# Patient Record
Sex: MALE | Race: WHITE | NOT HISPANIC OR LATINO | Employment: OTHER | URBAN - NONMETROPOLITAN AREA
[De-identification: names, ages, dates, MRNs, and addresses within clinical notes are randomized per-mention and may not be internally consistent; named-entity substitution may affect disease eponyms.]

---

## 2017-03-23 DIAGNOSIS — M54.50 LOW BACK PAIN: Primary | ICD-10-CM

## 2017-03-27 ENCOUNTER — HOSPITAL ENCOUNTER (OUTPATIENT)
Dept: MRI IMAGING | Facility: HOSPITAL | Age: 62
Discharge: HOME | End: 2017-03-27
Attending: FAMILY MEDICINE | Admitting: FAMILY MEDICINE
Payer: COMMERCIAL

## 2017-03-27 PROCEDURE — 72148 MRI LUMBAR SPINE W/O DYE: CPT | Mod: TC

## 2017-04-11 DIAGNOSIS — R10.13 ABDOMINAL PAIN, EPIGASTRIC: Primary | ICD-10-CM

## 2017-04-11 DIAGNOSIS — M54.9 BACK PAIN: ICD-10-CM

## 2017-04-11 DIAGNOSIS — R10.9 FLANK PAIN: ICD-10-CM

## 2017-04-12 ENCOUNTER — HOSPITAL ENCOUNTER (OUTPATIENT)
Dept: MRI IMAGING | Facility: HOSPITAL | Age: 62
Discharge: HOME | End: 2017-04-12
Attending: FAMILY MEDICINE | Admitting: FAMILY MEDICINE
Payer: COMMERCIAL

## 2017-04-12 PROCEDURE — A9585 GADOBUTROL INJECTION: HCPCS | Mod: TC

## 2017-04-12 PROCEDURE — 74183 MRI ABD W/O CNTR FLWD CNTR: CPT | Mod: TC

## 2017-04-12 PROCEDURE — 72146 MRI CHEST SPINE W/O DYE: CPT | Mod: TC

## 2017-04-12 RX ORDER — GADOBUTROL 604.72 MG/ML
10 INJECTION INTRAVENOUS ONCE
Status: COMPLETED | OUTPATIENT
Start: 2017-04-12 | End: 2017-04-12

## 2017-04-12 RX ADMIN — GADOBUTROL 10 ML: 604.72 INJECTION INTRAVENOUS at 16:09

## 2017-10-30 ENCOUNTER — HOSPITAL ENCOUNTER (EMERGENCY)
Facility: HOSPITAL | Age: 62
Discharge: HOME | End: 2017-10-30
Attending: PHYSICIAN ASSISTANT | Admitting: PHYSICIAN ASSISTANT
Payer: COMMERCIAL

## 2017-10-30 ENCOUNTER — APPOINTMENT (OUTPATIENT)
Dept: GENERAL RADIOLOGY | Facility: HOSPITAL | Age: 62
End: 2017-10-30
Attending: NURSE PRACTITIONER
Payer: COMMERCIAL

## 2017-10-30 VITALS
TEMPERATURE: 97.4 F | RESPIRATION RATE: 16 BRPM | SYSTOLIC BLOOD PRESSURE: 149 MMHG | OXYGEN SATURATION: 97 % | DIASTOLIC BLOOD PRESSURE: 84 MMHG

## 2017-10-30 DIAGNOSIS — M77.8 TENDONITIS OF WRIST, LEFT: ICD-10-CM

## 2017-10-30 PROCEDURE — 73110 X-RAY EXAM OF WRIST: CPT | Mod: TC,LT

## 2017-10-30 PROCEDURE — 99213 OFFICE O/P EST LOW 20 MIN: CPT | Performed by: PHYSICIAN ASSISTANT

## 2017-10-30 PROCEDURE — 99213 OFFICE O/P EST LOW 20 MIN: CPT

## 2017-10-30 RX ORDER — PREDNISONE 20 MG/1
TABLET ORAL
Qty: 10 TABLET | Refills: 0 | Status: SHIPPED | OUTPATIENT
Start: 2017-10-30 | End: 2018-12-07

## 2017-10-30 ASSESSMENT — ENCOUNTER SYMPTOMS
CARDIOVASCULAR NEGATIVE: 1
NEUROLOGICAL NEGATIVE: 1
MYALGIAS: 1
PSYCHIATRIC NEGATIVE: 1
JOINT SWELLING: 1
CONSTITUTIONAL NEGATIVE: 1
ARTHRALGIAS: 1

## 2017-10-30 NOTE — ED AVS SNAPSHOT
[facility]    [address] [address]    Phone:  [phone]                                       Hipolito Stout   MRN: [id number]    Department:  [facility]   Date of Visit:  10/30/2017           After Visit Summary Signature Page     I have received my discharge instructions, and my questions have been answered. I have discussed any challenges I see with this plan with the nurse or doctor.    ..........................................................................................................................................  Patient/Patient Representative Signature      ..........................................................................................................................................  Patient Representative Print Name and Relationship to Patient    ..................................................               ................................................  Date                                            Time    ..........................................................................................................................................  Reviewed by Signature/Title    ...................................................              ..............................................  Date                                                            Time

## 2017-10-30 NOTE — ED NOTES
Patient presents with complaints of left wrist pain.  First noticed pain approximately 1 week ago.  States symptoms have gotten worse.  No obvious trauma or injury noted.  CMS intact.  Some swelling noted.  Denies any recent tick bites.

## 2017-10-30 NOTE — ED AVS SNAPSHOT
[facility]    [address] [address]    Phone:  [phone]                                       Hipolito Stout   MRN: [id number]    Department:  [facility]   Date of Visit:  10/30/2017           Patient Information     Date Of Birth          1955        Your diagnoses for this visit were:     Tendonitis of wrist, left        You were seen by Leia Prabhakar PA.      Follow-up Information     Follow up with Beck Gonzalez MD.    Specialty:  Family Practice    Why:  If symptoms worsen    Contact information:    [facility]  [address] [address] [phone]          Follow up with [facility].    Specialty:  EMERGENCY MEDICINE    Why:  If further concerns develop    Contact information:    [address] [address] [phone]    Additional information:    From [facility]: Take [address]. Turn left at [address]. Turn left at the first stoplight on [address]. At the first stop sign, take a right onto [address]. Take a left into the parking lot and continue through until you reach the North enterance of the building.       From [place]: Take [address]. Take the [address] ramp towards [place]. Turn left onto [address]. Take a slight right onto [address]. Turn left at the first stoplight on [address]. At the first stop sign, take a right onto [address]. Take a left into the parking lot and continue through until you reach the North enterance of the building.       From [place]: Take [address]. Take a right at [address]. At the first stop sign, take a right onto [address]. Take a left into the parking lot and continue through until you reach the North enterance of the building.       Discharge References/Attachments     TENDONITIS (ENGLISH)      Future Appointments        Provider Department Dept  Phone Center    11/2/2017  8:00 AM [facility] [phone] [facility]         Review of your medicines      START taking        Dose / Directions Last dose taken    predniSONE 20 MG tablet   Commonly known as:  DELTASONE   Quantity:  10 tablet        Take two tablets (= 40mg) each day for 5 (five) days   Refills:  0          Our records show that you are taking the medicines listed below. If these are incorrect, please call your family doctor or clinic.        Dose / Directions Last dose taken    CRESTOR 40 MG tablet   Generic drug:  rosuvastatin        Take by mouth daily   Refills:  0        FISH OIL        daily   Refills:  0        LOPRESSOR PO   Dose:  50 mg        Take 50 mg by mouth daily   Refills:  0        MULTIVITAMIN PO        Take by mouth daily   Refills:  0        VITAMIN B-12 PO        Take by mouth daily   Refills:  0        vitamin D 2000 UNITS Caps        Take by mouth daily   Refills:  0        VITAMIN E        daily   Refills:  0                Prescriptions were sent or printed at these locations (1 Prescription)                   [address] [address]    Telephone:  [phone]   Fax:  [phone]   Hours:                  Printed at Department/Unit printer (1 of 1)         predniSONE (DELTASONE) 20 MG tablet                Procedures and tests performed during your visit     Wrist XR, G/E 3 views, left      Orders Needing Specimen Collection     None      Pending Results     No orders found from 10/28/2017 to 10/31/2017.            Pending Culture Results     No orders found from 10/28/2017 to 10/31/2017.            Thank you for choosing [facility]       Thank you for choosing [facility] for your care. Our goal is always to provide you with excellent care. Hearing back from our patients is one way we can continue to improve our services. Please take a few minutes to complete the written survey that you may  receive in the mail after you visit with us. Thank you!        [employer] Information     [employer] gives you secure access to your electronic health record. If you see a primary care provider, you can also send messages to your care team and make appointments. If you have questions, please call your primary care clinic.  If you do not have a primary care provider, please call [phone] and they will assist you.        Care EveryWhere ID     This is your Care EveryWhere ID. This could be used by other organizations to access your [facility] medical records  [id number]        Equal Access to Services     MARIMAR [facility] : Bj Moreira, marcellus davis, dl laureano, soniya gonzalez . So [facility] [phone].    ATENCIÓN: Si habla español, tiene a thomas disposición servicios gratuitos de asistencia lingüística. Llame al [phone].    We comply with applicable federal civil rights laws and [place] laws. We do not discriminate on the basis of race, color, national origin, age, disability, sex, sexual orientation, or gender identity.            After Visit Summary       This is your record. Keep this with you and show to your community pharmacist(s) and doctor(s) at your next visit.

## 2017-10-30 NOTE — ED NOTES
Pt presents with left wrist pain without knowledge of hurting it in any way.Has had these sx X 1 week.

## 2017-10-31 NOTE — ED PROVIDER NOTES
History     Chief Complaint   Patient presents with     Hand Pain     left wrist pain; no obvious injury or trauma     The history is provided by the patient. No  was used.     Hipolito Stout is a 62 year old male who has one week of increasing left wrist pain, on the pinky side. States he thinks he strained it when holding up a ladder that was trying to fall. Denies any other injury at this time.     Problem List:    Patient Active Problem List    Diagnosis Date Noted     LPRD (laryngopharyngeal reflux disease) 10/02/2013     Priority: Medium     Mass of oral cavity 10/02/2013     Priority: Medium     Thyroid nodule 10/02/2013     Priority: Medium     Neck pain on left side 10/02/2013     Priority: Medium        Past Medical History:    History reviewed. No pertinent past medical history.    Past Surgical History:    Past Surgical History:   Procedure Laterality Date     ABDOMEN SURGERY  08/2012    hernia repair     CHOLECYSTECTOMY       EYE SURGERY      cataract bilateral     ORTHOPEDIC SURGERY      carpal tunnel release bilateral       Family History:    Family History   Problem Relation Age of Onset     HEART DISEASE Mother      Cancer - colorectal Mother      HEART DISEASE Father      DIABETES Father      CANCER Maternal Grandfather      prostate     HEART DISEASE Paternal Grandmother      DIABETES Paternal Grandmother      CEREBROVASCULAR DISEASE Paternal Grandfather        Social History:  Marital Status:   [2]  Social History   Substance Use Topics     Smoking status: Former Smoker     Smokeless tobacco: Never Used     Alcohol use Yes      Comment: social        Medications:      predniSONE (DELTASONE) 20 MG tablet   Metoprolol Tartrate (LOPRESSOR PO)   rosuvastatin (CRESTOR) 40 MG tablet   Multiple Vitamins-Minerals (MULTIVITAMIN OR)   Cholecalciferol (VITAMIN D) 2000 UNITS CAPS   Cyanocobalamin (VITAMIN B-12 PO)   VITAMIN E   FISH OIL         Review of Systems    Constitutional: Negative.    Cardiovascular: Negative.    Musculoskeletal: Positive for arthralgias, joint swelling and myalgias.   Neurological: Negative.    Psychiatric/Behavioral: Negative.        Physical Exam   BP: 149/84  Heart Rate: 69  Temp: 97.4  F (36.3  C)  Resp: 16  SpO2: 97 %      Physical Exam   Constitutional: He is oriented to person, place, and time. He appears well-developed and well-nourished. No distress.   Cardiovascular: Normal rate.    Pulmonary/Chest: Effort normal.   Musculoskeletal:   Left wrist: mild TTP to radial side, mild edema. No erythema/ecchymosis. +AFROM w/ pain. 4/5 strength due to pain. M/n/v intact   Neurological: He is alert and oriented to person, place, and time.   Skin: He is not diaphoretic.   Psychiatric: He has a normal mood and affect.   Nursing note and vitals reviewed.      ED Course     ED Course     Procedures                Wrist XR, G/E 3 views, left (Final result) Result time: 10/30/17 11:17:26     Final result by Scott Boo MD (10/30/17 11:17:26)     Impression:     IMPRESSION: No acute fracture.      SCOTT BOO MD     Narrative:     PROCEDURE:  XR WRIST LEFT G/E 3 VIEWS    HISTORY: pain    COMPARISON:  None.    TECHNIQUE:  3 views of the left wrist were obtained.    FINDINGS:  No fracture or dislocation is identified. There are mild  degenerative changes of the radiocarpal joint and first  carpometacarpal joint.                          Assessments & Plan (with Medical Decision Making)     I have reviewed the nursing notes.    I have reviewed the findings, diagnosis, plan and need for follow up with the patient.      Discharge Medication List as of 10/30/2017 11:24 AM      START taking these medications    Details   predniSONE (DELTASONE) 20 MG tablet Take two tablets (= 40mg) each day for 5 (five) days, Disp-10 tablet, R-0, Local Print             Final diagnoses:   Tendonitis of wrist, left       Ice 15 min/hr while awake  Pt has a wrist splint at  home.  Elevate injured area above heart as often as possible and when resting. Take OTC motrin 800 mg every 8 hours as needed for pain/swelling. Apply ice at least three times a day x 20 minutes.   Patient verbally educated and given appropriate education sheets for the diagnoses and has no questions.  Follow up with your Primary Care provider if symptoms increase or if concerns develop, return to the ER  Leia Prabhakar Certified   Physician Assistant  10/30/2017  7:54 PM  URGENT CARE CLINIC    10/30/2017   [facility]     Leia Prabhakar PA  10/30/17 1956

## 2017-11-02 ENCOUNTER — HOSPITAL ENCOUNTER (OUTPATIENT)
Dept: MRI IMAGING | Facility: HOSPITAL | Age: 62
Discharge: HOME | End: 2017-11-02
Attending: FAMILY MEDICINE | Admitting: FAMILY MEDICINE
Payer: COMMERCIAL

## 2017-11-02 DIAGNOSIS — M54.6 THORACIC BACK PAIN: ICD-10-CM

## 2017-11-02 PROCEDURE — 72146 MRI CHEST SPINE W/O DYE: CPT | Mod: TC

## 2017-11-29 ENCOUNTER — HOSPITAL ENCOUNTER (OUTPATIENT)
Dept: MRI IMAGING | Facility: HOSPITAL | Age: 62
End: 2017-11-29
Attending: FAMILY MEDICINE
Payer: COMMERCIAL

## 2017-11-29 DIAGNOSIS — R10.11 ABDOMINAL PAIN, RIGHT UPPER QUADRANT: ICD-10-CM

## 2017-11-29 PROCEDURE — 25000128 H RX IP 250 OP 636: Performed by: RADIOLOGY

## 2017-11-29 PROCEDURE — A9585 GADOBUTROL INJECTION: HCPCS | Performed by: RADIOLOGY

## 2017-11-29 PROCEDURE — 74183 MRI ABD W/O CNTR FLWD CNTR: CPT | Mod: TC

## 2017-11-29 RX ORDER — GADOBUTROL 604.72 MG/ML
10 INJECTION INTRAVENOUS ONCE
Status: COMPLETED | OUTPATIENT
Start: 2017-11-29 | End: 2017-11-29

## 2017-11-29 RX ADMIN — GADOBUTROL 10 ML: 604.72 INJECTION INTRAVENOUS at 12:30

## 2018-02-01 ENCOUNTER — HOSPITAL ENCOUNTER (OUTPATIENT)
Dept: MRI IMAGING | Facility: HOSPITAL | Age: 63
Discharge: HOME | End: 2018-02-01
Attending: FAMILY MEDICINE | Admitting: FAMILY MEDICINE
Payer: MEDICARE

## 2018-02-01 DIAGNOSIS — M25.561 POSTERIOR KNEE PAIN, RIGHT: ICD-10-CM

## 2018-02-01 PROCEDURE — 73721 MRI JNT OF LWR EXTRE W/O DYE: CPT | Mod: TC,RT

## 2018-12-07 ENCOUNTER — APPOINTMENT (OUTPATIENT)
Dept: GENERAL RADIOLOGY | Facility: OTHER | Age: 63
End: 2018-12-07
Attending: EMERGENCY MEDICINE
Payer: MEDICARE

## 2018-12-07 ENCOUNTER — HOSPITAL ENCOUNTER (EMERGENCY)
Facility: OTHER | Age: 63
Discharge: HOME | End: 2018-12-07
Attending: EMERGENCY MEDICINE | Admitting: EMERGENCY MEDICINE
Payer: MEDICARE

## 2018-12-07 VITALS
OXYGEN SATURATION: 98 % | SYSTOLIC BLOOD PRESSURE: 162 MMHG | HEART RATE: 56 BPM | HEIGHT: 70 IN | RESPIRATION RATE: 16 BRPM | WEIGHT: 202 LBS | BODY MASS INDEX: 28.92 KG/M2 | DIASTOLIC BLOOD PRESSURE: 64 MMHG | TEMPERATURE: 96.8 F

## 2018-12-07 DIAGNOSIS — M70.62 GREATER TROCHANTERIC BURSITIS, LEFT: ICD-10-CM

## 2018-12-07 PROCEDURE — 25000132 ZZH RX MED GY IP 250 OP 250 PS 637: Mod: GY | Performed by: EMERGENCY MEDICINE

## 2018-12-07 PROCEDURE — 25000128 H RX IP 250 OP 636: Performed by: EMERGENCY MEDICINE

## 2018-12-07 PROCEDURE — 99283 EMERGENCY DEPT VISIT LOW MDM: CPT | Mod: Z6 | Performed by: EMERGENCY MEDICINE

## 2018-12-07 PROCEDURE — A9270 NON-COVERED ITEM OR SERVICE: HCPCS | Mod: GY | Performed by: EMERGENCY MEDICINE

## 2018-12-07 PROCEDURE — 99284 EMERGENCY DEPT VISIT MOD MDM: CPT | Mod: 25 | Performed by: EMERGENCY MEDICINE

## 2018-12-07 PROCEDURE — 73502 X-RAY EXAM HIP UNI 2-3 VIEWS: CPT

## 2018-12-07 PROCEDURE — 96372 THER/PROPH/DIAG INJ SC/IM: CPT | Performed by: EMERGENCY MEDICINE

## 2018-12-07 RX ORDER — CYCLOBENZAPRINE HCL 10 MG
10 TABLET ORAL 3 TIMES DAILY PRN
Qty: 20 TABLET | Refills: 0 | Status: SHIPPED | OUTPATIENT
Start: 2018-12-07 | End: 2018-12-13

## 2018-12-07 RX ORDER — CYCLOBENZAPRINE HCL 10 MG
10 TABLET ORAL ONCE
Status: COMPLETED | OUTPATIENT
Start: 2018-12-07 | End: 2018-12-07

## 2018-12-07 RX ORDER — KETOROLAC TROMETHAMINE 30 MG/ML
30 INJECTION, SOLUTION INTRAMUSCULAR; INTRAVENOUS ONCE
Status: COMPLETED | OUTPATIENT
Start: 2018-12-07 | End: 2018-12-07

## 2018-12-07 RX ADMIN — KETOROLAC TROMETHAMINE 30 MG: 30 INJECTION, SOLUTION INTRAMUSCULAR at 09:16

## 2018-12-07 RX ADMIN — CYCLOBENZAPRINE HYDROCHLORIDE 10 MG: 10 TABLET, FILM COATED ORAL at 10:41

## 2018-12-07 ASSESSMENT — ENCOUNTER SYMPTOMS
CHEST TIGHTNESS: 0
FEVER: 0
CHILLS: 0
ARTHRALGIAS: 1
LIGHT-HEADEDNESS: 0
AGITATION: 0
NAUSEA: 0
VOMITING: 0
DYSURIA: 0
SHORTNESS OF BREATH: 0

## 2018-12-07 NOTE — ED AVS SNAPSHOT
[facility]    [address]    [place] [address]    Phone:  [phone]    Fax:  [phone]                                       Hipolito Stout   MRN: [id number]    Department:  [facility]   Date of Visit:  12/7/2018           Patient Information     Date Of Birth          1955        Your diagnoses for this visit were:     Greater trochanteric bursitis, left        You were seen by Emir Tomas MD.        Discharge Instructions         Understanding Trochanteric Bursitis    A bursa is a thin, slippery, sac-like film. It contains a small amount of fluid. This structure is found between bones and soft tissues in and around joints. A bursa cushions and protects a joint. It keeps parts of a joint from rubbing against each other. If a bursa becomes inflamed and irritated, it is known as bursitis.  The trochanteric bursa is found on the hip joint. It lies on top of the bump at the top of the thighbone called the greater trochanter. Inflammation of this bursa is called trochanteric bursitis.     How to say it  [id number]   Causes of trochanteric bursitis  Causes may include:    Overuse of the hip during running or other sports, dance, or work    Falling on or irritation to the side of the hip  This condition may occur along with other problems, such as osteoarthritis of the hip or knee, or low back problems. In rare cases, it may occur after hip surgery.  Symptoms of trochanteric bursitis    Pain or aching on the side of the hip. The pain may travel down the leg.    Swelling, tenderness, or warmth on the side of the hip at the bony bump at the top of the thigh  Treatment for trochanteric bursitis  These may include:    Resting the hip. This allows the bursa to heal.    Prescription or over-the-counter pain medicines. These help reduce inflammation, swelling, and pain.    Cold packs and heat packs. These help reduce pain and  swelling.    Stretching and strengthening exercises. These improve flexibility and strength around the hip.    Physical therapy. This includes exercises or other treatments.    Injections of medicine into the bursa. This may help reduce inflammation and relieve symptoms.  Possible complications  If you don t give your hip time to heal, the problem may not go away, may return, or may get worse. Rest and treat your hip as directed.     When to call your healthcare provider  Call your healthcare provider right away if you have any of these:    Fever of 100.4 F (38 C) or higher, or as directed    Redness, swelling, or warmth that gets worse    Symptoms that don t get better with prescribed medicines, or get worse    New symptoms   Date Last Reviewed: 3/29/2016    8475-2284 [employer]. [address] [place] [address], [place] [address]. All rights reserved. This information is not intended as a substitute for professional medical care. Always follow your healthcare professional's instructions.          24 Hour Appointment Hotline     To schedule an appointment at [facility], please call [phone]. If you don't have a family doctor or clinic, we will help you find one. [facility] are conveniently located to serve the needs of you and your family.           Review of your medicines      START taking        Dose / Directions Last dose taken    cyclobenzaprine 10 MG tablet   Commonly known as:  FLEXERIL   Dose:  10 mg   Quantity:  20 tablet        Take 1 tablet (10 mg) by mouth 3 times daily as needed for muscle spasms   Refills:  0          Our records show that you are taking the medicines listed below. If these are incorrect, please call your family doctor or clinic.        Dose / Directions Last dose taken    CRESTOR 40 MG tablet   Generic drug:  rosuvastatin        Take by mouth daily   Refills:  0        FISH OIL        daily   Refills:  0        LOPRESSOR PO   Dose:  50 mg        Take 50 mg by mouth  daily   Refills:  0        MULTIVITAMIN PO        Take by mouth daily   Refills:  0        VITAMIN B-12 PO        Take by mouth daily   Refills:  0        vitamin D 2000 units Caps        Take by mouth daily   Refills:  0        VITAMIN E        daily   Refills:  0                Prescriptions were sent or printed at these locations (1 Prescription)                   [id number] - [place] - [address]   [address] [place] [address]    Telephone:  [phone]   Fax:  [phone]   Hours:                  Printed at Department/Unit printer (1 of 1)         cyclobenzaprine (FLEXERIL) 10 MG tablet                Procedures and tests performed during your visit     XR Hip Left 2-3 Views      Orders Needing Specimen Collection     None      Pending Results     No orders found from 12/5/2018 to 12/8/2018.            Pending Culture Results     No orders found from 12/5/2018 to 12/8/2018.            Pending Results Instructions     If you had any lab results that were not finalized at the time of your Discharge, you can call the ED Lab Result RN at [phone]. You will be contacted by this team for any positive Lab results or changes in treatment. The nurses are available 7 days a week from 10A to 6:30P.  You can leave a message 24 hours per day and they will return your call.        Thank you for choosing [facility]       Thank you for choosing [facility] for your care. Our goal is always to provide you with excellent care. Hearing back from our patients is one way we can continue to improve our services. Please take a few minutes to complete the written survey that you may receive in the mail after you visit with us. Thank you!        [employer] Information     [email or web address] gives you secure access to your electronic health record. If you see a primary care provider, you can also send messages to your care team and make appointments. If you have questions, please call your  primary care clinic.  If you do not have a primary care provider, please call [phone] and they will assist you.        Care EveryWhere ID     This is your Care EveryWhere ID. This could be used by other organizations to access your [facility] medical records  [id number]        Equal Access to Services     MARIMAR KNAPP : Bj Moreira, marcellus davis, dl laureano, soniya zhang. So [facility] [phone].    ATENCIÓN: Si habla español, tiene a thomas disposición servicios gratuitos de asistencia lingüística. Llame al [phone].    We comply with applicable federal civil rights laws and [place] laws. We do not discriminate on the basis of race, color, national origin, age, disability, sex, sexual orientation, or gender identity.            After Visit Summary       This is your record. Keep this with you and show to your community pharmacist(s) and doctor(s) at your next visit.

## 2018-12-07 NOTE — ED AVS SNAPSHOT
[facility]    [address]    [place] [address]    Phone:  [phone]    Fax:  [phone]                                       Hipolito Stout   MRN: [id number]    Department:  [facility]   Date of Visit:  12/7/2018           After Visit Summary Signature Page     I have received my discharge instructions, and my questions have been answered. I have discussed any challenges I see with this plan with the nurse or doctor.    ..........................................................................................................................................  Patient/Patient Representative Signature      ..........................................................................................................................................  Patient Representative Print Name and Relationship to Patient    ..................................................               ................................................  Date                                   Time    ..........................................................................................................................................  Reviewed by Signature/Title    ...................................................              ..............................................  Date                                               Time          22EPIC Rev 08/18

## 2018-12-07 NOTE — ED PROVIDER NOTES
History     Chief Complaint   Patient presents with     Hip Pain     The history is provided by the patient.     Hipolito Stout is a 63 year old male who is here complaining of left hip pain.  It began 4 days ago after he had been quite active.  He had been playing hockey earlier in the day, after that he was up and down a ladder quite a bit putting up [place] lights.  He did not really have any pain while he was doing this, however in the evening he started developing pain.  Over the last few days it is gotten progressively worse.  Last night he did not sleep at all because of the pain.  He does have a little bit of occasional sharp shooting pain in his low back, but this does not seem to be associated with the hip pain.  He says he can find a spot on the lateral hip that is tender when he pushes.  The pain seems to radiate out from here into the groin area and towards the low back as well.  He is able to walk but he is limping quite a bit.  He has tried Tylenol just once and this did not seem to help.  Not feeling ill otherwise.    Problem List:    Patient Active Problem List    Diagnosis Date Noted     LPRD (laryngopharyngeal reflux disease) 10/02/2013     Priority: Medium     Mass of oral cavity 10/02/2013     Priority: Medium     Thyroid nodule 10/02/2013     Priority: Medium     Neck pain on left side 10/02/2013     Priority: Medium        Past Medical History:    History reviewed. No pertinent past medical history.    Past Surgical History:    Past Surgical History:   Procedure Laterality Date     ABDOMEN SURGERY  08/2012    hernia repair     CHOLECYSTECTOMY       EYE SURGERY      cataract bilateral     ORTHOPEDIC SURGERY      carpal tunnel release bilateral       Family History:    Family History   Problem Relation Age of Onset     Heart Disease Mother      Cancer - colorectal Mother      Heart Disease Father      Diabetes Father      Cancer Maternal Grandfather      prostate     Heart Disease  "Paternal Grandmother      Diabetes Paternal Grandmother      Cerebrovascular Disease Paternal Grandfather        Social History:  Marital Status:   [2]  Social History   Substance Use Topics     Smoking status: Former Smoker     Smokeless tobacco: Never Used     Alcohol use Yes      Comment: social        Medications:      cyclobenzaprine (FLEXERIL) 10 MG tablet   Metoprolol Tartrate (LOPRESSOR PO)   rosuvastatin (CRESTOR) 40 MG tablet   Cholecalciferol (VITAMIN D) 2000 UNITS CAPS   Cyanocobalamin (VITAMIN B-12 PO)   FISH OIL   Multiple Vitamins-Minerals (MULTIVITAMIN OR)   VITAMIN E         Review of Systems   Constitutional: Negative for chills and fever.   HENT: Negative for congestion.    Eyes: Negative for visual disturbance.   Respiratory: Negative for chest tightness and shortness of breath.    Cardiovascular: Negative for chest pain.   Gastrointestinal: Negative for nausea and vomiting.   Genitourinary: Negative for dysuria.   Musculoskeletal: Positive for arthralgias.   Skin: Negative for rash.   Neurological: Negative for light-headedness.   Psychiatric/Behavioral: Negative for agitation.       Physical Exam   BP: 176/89  Heart Rate: 56  Temp: 96.7  F (35.9  C)  Resp: 16  Height: 177.8 cm (5' 10\")  Weight: 91.6 kg (202 lb)  SpO2: 98 %      Physical Exam   Constitutional: He is oriented to person, place, and time. He appears well-developed and well-nourished. No distress.   HENT:   Head: Normocephalic and atraumatic.   Eyes: Conjunctivae are normal.   Neck: Neck supple.   Cardiovascular: Normal rate.    Pulmonary/Chest: Effort normal.   Musculoskeletal:   He is tender over the left greater trochanteric bursa.  He does have a little bit of pain with range of motion in the hip but with passive range of motion he has full range of motion.  He says when he tries to lift his  leg up off the bed it is not too painful but when he lets it back down again it is very painful.   Neurological: He is alert and " oriented to person, place, and time.   Skin: Skin is warm and dry. He is not diaphoretic.   Psychiatric: He has a normal mood and affect. His behavior is normal.   Nursing note and vitals reviewed.      ED Course     ED Course     Procedures              Results for orders placed or performed during the hospital encounter of 12/07/18 (from the past 24 hour(s))   XR Hip Left 2-3 Views    Narrative    PROCEDURE:  XR HIP LEFT 2-3 VIEWS    HISTORY: pain over greater trochanter;     COMPARISON:  None.    TECHNIQUE:  2 views of the left hip were obtained.    FINDINGS:  No fracture or dislocation is identified. The joint spaces  are preserved.        Impression    IMPRESSION: No acute fracture.      SCOTT BOO MD       Medications   cyclobenzaprine (FLEXERIL) tablet 10 mg (not administered)   ketorolac (TORADOL) injection 30 mg (30 mg Intramuscular Given 12/7/18 0916)       Assessments & Plan (with Medical Decision Making)     I have reviewed the nursing notes.    I have reviewed the findings, diagnosis, plan and need for follow up with the patient.  I believe he has a greater trochanteric bursitis on the left.  Recommended anti-inflammatories.  He has ibuprofen at home so I suggested 800 mg every 6-8 hours with food.  He may have a little bit of muscle spasm in the area as well so we will try some Flexeril to see if that is helpful.  If this does help he is also given a prescription for it.  He will follow-up with his primary physician next week, he can return here if worse.    New Prescriptions    CYCLOBENZAPRINE (FLEXERIL) 10 MG TABLET    Take 1 tablet (10 mg) by mouth 3 times daily as needed for muscle spasms       Final diagnoses:   Greater trochanteric bursitis, left       12/7/2018   [facility]     Emir Tomas MD  12/07/18 5284

## 2018-12-07 NOTE — ED TRIAGE NOTES
Patient reporting pain in left hip that started on Monday and has progressively gotten worse. Patient denies injury to hip. Left hip is weakened compared to right hip. Patient A/O, able to walk on hip to room. Patient resting comfortably in room. Patient denies using ice or heat, or medications at home for pain.

## 2018-12-07 NOTE — DISCHARGE INSTRUCTIONS
Understanding Trochanteric Bursitis    A bursa is a thin, slippery, sac-like film. It contains a small amount of fluid. This structure is found between bones and soft tissues in and around joints. A bursa cushions and protects a joint. It keeps parts of a joint from rubbing against each other. If a bursa becomes inflamed and irritated, it is known as bursitis.  The trochanteric bursa is found on the hip joint. It lies on top of the bump at the top of the thighbone called the greater trochanter. Inflammation of this bursa is called trochanteric bursitis.     How to say it  [id number]   Causes of trochanteric bursitis  Causes may include:    Overuse of the hip during running or other sports, dance, or work    Falling on or irritation to the side of the hip  This condition may occur along with other problems, such as osteoarthritis of the hip or knee, or low back problems. In rare cases, it may occur after hip surgery.  Symptoms of trochanteric bursitis    Pain or aching on the side of the hip. The pain may travel down the leg.    Swelling, tenderness, or warmth on the side of the hip at the bony bump at the top of the thigh  Treatment for trochanteric bursitis  These may include:    Resting the hip. This allows the bursa to heal.    Prescription or over-the-counter pain medicines. These help reduce inflammation, swelling, and pain.    Cold packs and heat packs. These help reduce pain and swelling.    Stretching and strengthening exercises. These improve flexibility and strength around the hip.    Physical therapy. This includes exercises or other treatments.    Injections of medicine into the bursa. This may help reduce inflammation and relieve symptoms.  Possible complications  If you don t give your hip time to heal, the problem may not go away, may return, or may get worse. Rest and treat your hip as directed.     When to call your healthcare provider  Call your healthcare provider right away if you have  any of these:    Fever of 100.4 F (38 C) or higher, or as directed    Redness, swelling, or warmth that gets worse    Symptoms that don t get better with prescribed medicines, or get worse    New symptoms   Date Last Reviewed: 3/29/2016    0523-4292 [employer]. [address] [place] [address]. All rights reserved. This information is not intended as a substitute for professional medical care. Always follow your healthcare professional's instructions.

## 2019-05-15 ENCOUNTER — OFFICE VISIT (OUTPATIENT)
Dept: UROLOGY | Facility: OTHER | Age: 64
End: 2019-05-15
Attending: UROLOGY
Payer: COMMERCIAL

## 2019-05-15 VITALS
SYSTOLIC BLOOD PRESSURE: 130 MMHG | BODY MASS INDEX: 29.44 KG/M2 | DIASTOLIC BLOOD PRESSURE: 78 MMHG | TEMPERATURE: 97.8 F | WEIGHT: 205.2 LBS | RESPIRATION RATE: 18 BRPM | HEART RATE: 64 BPM

## 2019-05-15 DIAGNOSIS — N32.81 OVERACTIVE BLADDER: Primary | ICD-10-CM

## 2019-05-15 LAB
ALBUMIN UR-MCNC: NEGATIVE MG/DL
APPEARANCE UR: CLEAR
BILIRUB UR QL STRIP: NEGATIVE
COLOR UR AUTO: YELLOW
GLUCOSE UR STRIP-MCNC: NEGATIVE MG/DL
HGB UR QL STRIP: NEGATIVE
KETONES UR STRIP-MCNC: NEGATIVE MG/DL
LEUKOCYTE ESTERASE UR QL STRIP: NEGATIVE
NITRATE UR QL: NEGATIVE
PH UR STRIP: 5.5 PH (ref 5–9)
SOURCE: NORMAL
SP GR UR STRIP: 1.01 (ref 1–1.03)
UROBILINOGEN UR STRIP-ACNC: 0.2 EU/DL (ref 0.2–1)

## 2019-05-15 PROCEDURE — 81003 URINALYSIS AUTO W/O SCOPE: CPT | Mod: ZL | Performed by: UROLOGY

## 2019-05-15 PROCEDURE — 51798 US URINE CAPACITY MEASURE: CPT

## 2019-05-15 PROCEDURE — 99203 OFFICE O/P NEW LOW 30 MIN: CPT | Performed by: UROLOGY

## 2019-05-15 PROCEDURE — G0463 HOSPITAL OUTPT CLINIC VISIT: HCPCS | Mod: 25

## 2019-05-15 ASSESSMENT — PAIN SCALES - GENERAL: PAINLEVEL: MODERATE PAIN (5)

## 2019-05-15 NOTE — NURSING NOTE
Chief Complaint   Patient presents with     Consult For     Urination Pattern   Post-Void Residual  A post-void residual was measured by ultrasonic bladder scanner.  0 mL  Marybeth Wren LPN  5/15/2019 11:42 AM        Medication Reconciliation: completed   Marybeth Wren LPN  5/15/2019 11:36 AM

## 2019-05-15 NOTE — PROGRESS NOTES
Type of Visit  NPV    Chief Complaint  Urinary frequency    HPI  Mr. Stout is a 64 year old male who presents with concerns regarding urinary pattern.  The patient denies gross hematuria.  He does have a remote history of kidney stones but denies acute renal colic.  He does complain of noticing worsening urgency and frequency over the last few years.  The overall clinical bother is low.  He denies dysuria.  He underwent CT imaging about 4 years ago which revealed no stones.      Past Medical History  He  has no past medical history on file.  Patient Active Problem List   Diagnosis     LPRD (laryngopharyngeal reflux disease)     Mass of oral cavity     Thyroid nodule     Neck pain on left side       Past Surgical History  He  has a past surgical history that includes Abdomen surgery (08/2012); Cholecystectomy; orthopedic surgery; and Eye surgery.    Medications  He has a current medication list which includes the following prescription(s): vitamin d, cyanocobalamin, fish oil, metoprolol tartrate, multiple vitamins-minerals, rosuvastatin, and vitamin e.    Allergies  Allergies   Allergen Reactions     Cats        Social History  He  reports that he has quit smoking. He has never used smokeless tobacco. He reports that he drinks alcohol. He reports that he does not use drugs.  No drug abuse.    Family History  Family History   Problem Relation Age of Onset     Heart Disease Mother      Cancer - colorectal Mother      Heart Disease Father      Diabetes Father      Cancer Maternal Grandfather         prostate     Heart Disease Paternal Grandmother      Diabetes Paternal Grandmother      Cerebrovascular Disease Paternal Grandfather        Review of Systems  I personally reviewed the ROS with the patient.    Nursing Notes:   Marybeth Wren LPN  5/15/2019 11:36 AM  Signed  Chief Complaint   Patient presents with     Consult For     Urination Pattern   Patient presents to the clinic for urination pattern.  Review of  Systems:    Weight loss:    No     Recent fever/chills:  No   Night sweats:   Yes  Current skin rash:  No   Recent hair loss:  No  Heat intolerance:  No   Cold intolerance:  No  Chest pain:   No   Palpitations:   Yes  Shortness of breath:  Yes   Wheezing:   No  Constipation:    Yes   Diarrhea:   Yes   Nausea:   No   Vomiting:   No   Kidney/side pain:  Yes   Back pain:   Yes  Frequent headaches:  No   Dizziness:     Yes  Leg swelling:   Yes   Calf pain:    Yes    Parents, brothers or sisters with history of kidney cancer:   No  Parents, brothers or sisters with history of bladder cancer: No      Medication Reconciliation: completed   Marybeth Wren LPN  5/15/2019 11:29 AM     Marybeth Wren LPN  5/15/2019 11:47 AM  Signed  Chief Complaint   Patient presents with     Consult For     Urination Pattern   Post-Void Residual  A post-void residual was measured by ultrasonic bladder scanner.  0 mL  Marybeth Wren LPN  5/15/2019 11:42 AM        Medication Reconciliation: completed   Marybeth Wren LPN  5/15/2019 11:36 AM     Physical Exam  Vitals:    05/15/19 1132   BP: 130/78   BP Location: Right arm   Patient Position: Sitting   Cuff Size: Adult Large   Pulse: 64   Resp: 18   Temp: 97.8  F (36.6  C)   TempSrc: Temporal   Weight: 93.1 kg (205 lb 3.2 oz)     Constitutional: No acute distress.  Alert and cooperative   Head: NCAT  Eyes: Conjunctivae normal  Cardiovascular: Regular rate.  Pulmonary/Chest: Respirations are even and non-labored bilaterally, no audible wheezing  Abdominal: Soft. No distension, tenderness, masses or guarding.   Neurological: A + O x 3.  Cranial Nerves II-XII grossly intact.  Extremities: KELY x 4, Warm. No clubbing.  No cyanosis.    Skin: Pink, warm and dry.  No visible rashes noted.  Psychiatric:  Normal mood and affect  Back:  No left CVA tenderness.  No right CVA tenderness.  Genitourinary:  Nonpalpable bladder    Labs  Results for orders placed or performed in visit on 05/15/19   *UA reflex to  Microscopic   Result Value Ref Range    Color Urine Yellow     Appearance Urine Clear     Glucose Urine Negative NEG^Negative mg/dL    Bilirubin Urine Negative NEG^Negative    Ketones Urine Negative NEG^Negative mg/dL    Specific Gravity Urine 1.015 1.000 - 1.030    Blood Urine Negative NEG^Negative    pH Urine 5.5 5.0 - 9.0 pH    Protein Albumin Urine Negative NEG^Negative mg/dL    Urobilinogen Urine 0.2 0.2 - 1.0 EU/dL    Nitrite Urine Negative NEG^Negative    Leukocyte Esterase Urine Negative NEG^Negative    Source Unspecified Urine      OSH labs  PSA over last 9 years all <1, most recently 5/2019    Stone analysis  2011  100% Calcium oxalate monohydrate    Imaging  CT a/p   12/2/2015  I personally reviewed and interpreted the images and report.  IMPRESSION:  NO ACUTE INTRA-ABDOMINAL PROCESS.    Post-Void Residual  A post-void residual was measured by ultrasonic bladder scanner.  0 mL today    Assessment & Plan  Mr. Stotu is a 64 year old male who presents with concerns regarding urinary urgency.  His PSA is normal, PVR is normal, UA is completely clear.  We discussed anticholinergic medications but I recommended against the medications as I feel the side effects will probably cause more problem than the symptoms.  I reviewed the objective data listed above and reassured the patient that all testing is normal.  He requires no further testing for his urinary symptoms currently.

## 2019-05-15 NOTE — NURSING NOTE
Chief Complaint   Patient presents with     Consult For     Urination Pattern   Patient presents to the clinic for urination pattern.  Review of Systems:    Weight loss:    No     Recent fever/chills:  No   Night sweats:   Yes  Current skin rash:  No   Recent hair loss:  No  Heat intolerance:  No   Cold intolerance:  No  Chest pain:   No   Palpitations:   Yes  Shortness of breath:  Yes   Wheezing:   No  Constipation:    Yes   Diarrhea:   Yes   Nausea:   No   Vomiting:   No   Kidney/side pain:  Yes   Back pain:   Yes  Frequent headaches:  No   Dizziness:     Yes  Leg swelling:   Yes   Calf pain:    Yes    Parents, brothers or sisters with history of kidney cancer:   No  Parents, brothers or sisters with history of bladder cancer: No      Medication Reconciliation: completed   Marybeth Wren LPN  5/15/2019 11:29 AM

## 2019-09-03 ENCOUNTER — HOSPITAL ENCOUNTER (OUTPATIENT)
Dept: MRI IMAGING | Facility: OTHER | Age: 64
Discharge: HOME | End: 2019-09-03
Admitting: FAMILY MEDICINE
Payer: MEDICARE

## 2019-09-03 DIAGNOSIS — M54.9 MID BACK PAIN: ICD-10-CM

## 2019-09-03 DIAGNOSIS — M54.2 CERVICALGIA: ICD-10-CM

## 2019-09-03 PROCEDURE — 72141 MRI NECK SPINE W/O DYE: CPT

## 2019-10-04 ENCOUNTER — HEALTH MAINTENANCE LETTER (OUTPATIENT)
Age: 64
End: 2019-10-04

## 2019-10-18 ENCOUNTER — HOSPITAL ENCOUNTER (EMERGENCY)
Facility: OTHER | Age: 64
Discharge: HOME | End: 2019-10-18
Attending: EMERGENCY MEDICINE | Admitting: EMERGENCY MEDICINE
Payer: MEDICARE

## 2019-10-18 ENCOUNTER — APPOINTMENT (OUTPATIENT)
Dept: GENERAL RADIOLOGY | Facility: OTHER | Age: 64
End: 2019-10-18
Attending: EMERGENCY MEDICINE
Payer: MEDICARE

## 2019-10-18 VITALS
OXYGEN SATURATION: 96 % | HEIGHT: 70 IN | DIASTOLIC BLOOD PRESSURE: 91 MMHG | BODY MASS INDEX: 29.35 KG/M2 | RESPIRATION RATE: 8 BRPM | HEART RATE: 77 BPM | TEMPERATURE: 97.5 F | WEIGHT: 205 LBS | SYSTOLIC BLOOD PRESSURE: 141 MMHG

## 2019-10-18 DIAGNOSIS — R07.9 CHEST PAIN, UNSPECIFIED TYPE: ICD-10-CM

## 2019-10-18 LAB
ALBUMIN SERPL-MCNC: 4.7 G/DL (ref 3.5–5.7)
ALP SERPL-CCNC: 42 U/L (ref 34–104)
ALT SERPL W P-5'-P-CCNC: 22 U/L (ref 7–52)
ANION GAP SERPL CALCULATED.3IONS-SCNC: 7 MMOL/L (ref 3–14)
APTT PPP: 30 SEC (ref 22–37)
AST SERPL W P-5'-P-CCNC: 21 U/L (ref 13–39)
BASOPHILS # BLD AUTO: 0 10E9/L (ref 0–0.2)
BASOPHILS NFR BLD AUTO: 0.6 %
BILIRUB SERPL-MCNC: 0.8 MG/DL (ref 0.3–1)
BUN SERPL-MCNC: 16 MG/DL (ref 7–25)
CALCIUM SERPL-MCNC: 9.7 MG/DL (ref 8.6–10.3)
CHLORIDE SERPL-SCNC: 103 MMOL/L (ref 98–107)
CO2 SERPL-SCNC: 28 MMOL/L (ref 21–31)
CREAT SERPL-MCNC: 1.02 MG/DL (ref 0.7–1.3)
DIFFERENTIAL METHOD BLD: NORMAL
EOSINOPHIL # BLD AUTO: 0.1 10E9/L (ref 0–0.7)
EOSINOPHIL NFR BLD AUTO: 1.5 %
ERYTHROCYTE [DISTWIDTH] IN BLOOD BY AUTOMATED COUNT: 12.5 % (ref 10–15)
GFR SERPL CREATININE-BSD FRML MDRD: 73 ML/MIN/{1.73_M2}
GLUCOSE SERPL-MCNC: 108 MG/DL (ref 70–105)
HCT VFR BLD AUTO: 46.3 % (ref 40–53)
HGB BLD-MCNC: 15.2 G/DL (ref 13.3–17.7)
IMM GRANULOCYTES # BLD: 0 10E9/L (ref 0–0.4)
IMM GRANULOCYTES NFR BLD: 0.4 %
INR PPP: 0.92 (ref 0–1.3)
LYMPHOCYTES # BLD AUTO: 1.6 10E9/L (ref 0.8–5.3)
LYMPHOCYTES NFR BLD AUTO: 30.6 %
MAGNESIUM SERPL-MCNC: 2.2 MG/DL (ref 1.9–2.7)
MCH RBC QN AUTO: 29.6 PG (ref 26.5–33)
MCHC RBC AUTO-ENTMCNC: 32.8 G/DL (ref 31.5–36.5)
MCV RBC AUTO: 90 FL (ref 78–100)
MONOCYTES # BLD AUTO: 0.4 10E9/L (ref 0–1.3)
MONOCYTES NFR BLD AUTO: 8.1 %
NEUTROPHILS # BLD AUTO: 3.1 10E9/L (ref 1.6–8.3)
NEUTROPHILS NFR BLD AUTO: 58.8 %
NT-PROBNP SERPL-MCNC: 10 PG/ML (ref 0–100)
PLATELET # BLD AUTO: 208 10E9/L (ref 150–450)
POTASSIUM SERPL-SCNC: 4.6 MMOL/L (ref 3.5–5.1)
PROT SERPL-MCNC: 7.8 G/DL (ref 6.4–8.9)
RBC # BLD AUTO: 5.14 10E12/L (ref 4.4–5.9)
SODIUM SERPL-SCNC: 138 MMOL/L (ref 134–144)
TROPONIN I SERPL-MCNC: 3 PG/ML
TROPONIN I SERPL-MCNC: 3.7 PG/ML
WBC # BLD AUTO: 5.2 10E9/L (ref 4–11)

## 2019-10-18 PROCEDURE — 25000132 ZZH RX MED GY IP 250 OP 250 PS 637: Performed by: EMERGENCY MEDICINE

## 2019-10-18 PROCEDURE — 94640 AIRWAY INHALATION TREATMENT: CPT | Performed by: EMERGENCY MEDICINE

## 2019-10-18 PROCEDURE — 85610 PROTHROMBIN TIME: CPT | Performed by: EMERGENCY MEDICINE

## 2019-10-18 PROCEDURE — 85730 THROMBOPLASTIN TIME PARTIAL: CPT | Performed by: EMERGENCY MEDICINE

## 2019-10-18 PROCEDURE — 36415 COLL VENOUS BLD VENIPUNCTURE: CPT | Performed by: EMERGENCY MEDICINE

## 2019-10-18 PROCEDURE — 84484 ASSAY OF TROPONIN QUANT: CPT | Performed by: EMERGENCY MEDICINE

## 2019-10-18 PROCEDURE — 99285 EMERGENCY DEPT VISIT HI MDM: CPT | Mod: 25 | Performed by: EMERGENCY MEDICINE

## 2019-10-18 PROCEDURE — 25000125 ZZHC RX 250: Performed by: EMERGENCY MEDICINE

## 2019-10-18 PROCEDURE — 99283 EMERGENCY DEPT VISIT LOW MDM: CPT | Mod: Z6 | Performed by: EMERGENCY MEDICINE

## 2019-10-18 PROCEDURE — 80053 COMPREHEN METABOLIC PANEL: CPT | Performed by: EMERGENCY MEDICINE

## 2019-10-18 PROCEDURE — 83735 ASSAY OF MAGNESIUM: CPT | Performed by: EMERGENCY MEDICINE

## 2019-10-18 PROCEDURE — 93005 ELECTROCARDIOGRAM TRACING: CPT | Performed by: EMERGENCY MEDICINE

## 2019-10-18 PROCEDURE — 71046 X-RAY EXAM CHEST 2 VIEWS: CPT

## 2019-10-18 PROCEDURE — 83880 ASSAY OF NATRIURETIC PEPTIDE: CPT | Performed by: EMERGENCY MEDICINE

## 2019-10-18 PROCEDURE — 85025 COMPLETE CBC W/AUTO DIFF WBC: CPT | Performed by: EMERGENCY MEDICINE

## 2019-10-18 PROCEDURE — 93010 ELECTROCARDIOGRAM REPORT: CPT | Performed by: INTERNAL MEDICINE

## 2019-10-18 RX ORDER — IPRATROPIUM BROMIDE AND ALBUTEROL SULFATE 2.5; .5 MG/3ML; MG/3ML
3 SOLUTION RESPIRATORY (INHALATION) ONCE
Status: COMPLETED | OUTPATIENT
Start: 2019-10-18 | End: 2019-10-18

## 2019-10-18 RX ORDER — ASPIRIN 81 MG/1
324 TABLET, CHEWABLE ORAL ONCE
Status: COMPLETED | OUTPATIENT
Start: 2019-10-18 | End: 2019-10-18

## 2019-10-18 RX ADMIN — IPRATROPIUM BROMIDE AND ALBUTEROL SULFATE 3 ML: .5; 3 SOLUTION RESPIRATORY (INHALATION) at 10:53

## 2019-10-18 RX ADMIN — ASPIRIN 81 MG 324 MG: 81 TABLET ORAL at 10:45

## 2019-10-18 ASSESSMENT — MIFFLIN-ST. JEOR: SCORE: 1716.12

## 2019-10-18 NOTE — ED PROVIDER NOTES
[facility]  Emergency Department Visit Note    Respiratory Problems and Fatigue      History of Present Illness     Hipolito Stout is a 64 year old male presenting with chest pain. This pain started approximately 1 day prior to arrival and the onset was while at rest. The pain is characterized as tightness in his upper ches does not, does not radiate, and is not associated with shortness of breath, nausea, vomiting, diaphoresis, change with respiration. No cough or fevers. The patient has had similar symptoms in the past. No falls or other recent injuries.  He has chronically tired and is diagnosed with chronic fatigue syndrome and he states that yesterday he was in bed all day.  He had coronary angiography in 2011 and an February of this year and both times had relatively clean coronary arteries that did not require any intervention.  He is been having intermittent shortness of breath and chest pain and seen by his primary care physician and a CT chest was ordered last week.  This was reviewed and showed mild COPD but no evidence of infection or mass.  Patient did raise the concern that he might have blastomycosis as he thinks he has all the symptoms that he is reassured that his CT was normal and did not show evidence of this.    Medications:  Prior to Admission medications    Medication Sig Last Dose Taking? Auth Provider   Cholecalciferol (VITAMIN D) 2000 UNITS CAPS Take by mouth daily 10/17/2019 at am Yes Reported, Patient   Cyanocobalamin (VITAMIN B-12 PO) Take by mouth daily 10/17/2019 at am Yes Reported, Patient   FISH OIL daily 10/17/2019 at am Yes Reported, Patient   Metoprolol Tartrate (LOPRESSOR PO) Take 50 mg by mouth daily 10/13/2019 at am Yes Reported, Patient   Multiple Vitamins-Minerals (MULTIVITAMIN OR) Take by mouth daily 10/17/2019 at am Yes Reported, Patient   rosuvastatin (CRESTOR) 40 MG tablet Take by mouth daily 10/17/2019 at pm Yes Reported, Patient   VITAMIN E  "daily 10/17/2019 at am Yes Reported, Patient     Allergies:  Allergies   Allergen Reactions     Cats      Problem List:  Patient Active Problem List   Diagnosis     LPRD (laryngopharyngeal reflux disease)     Mass of oral cavity     Thyroid nodule     Neck pain on left side     Past Medical History:  History reviewed. No pertinent past medical history.    Past Surgical History:  Past Surgical History:   Procedure Laterality Date     ABDOMEN SURGERY  08/2012    hernia repair     CHOLECYSTECTOMY       EYE SURGERY      cataract bilateral     ORTHOPEDIC SURGERY      carpal tunnel release bilateral       Social History:  Social History     Tobacco Use     Smoking status: Former Smoker     Packs/day: 0.00     Smokeless tobacco: Never Used   Substance Use Topics     Alcohol use: Yes     Comment: social     Drug use: No       Review of Systems:  10 point review of systems obtained and pertinent positive and negative findings noted in HPI. Review of systems otherwise negative.      Physical Exam     Vital signs: BP (!) 141/82   Pulse 74   Temp 97.5  F (36.4  C) (Tympanic)   Resp 12   Ht 1.778 m (5' 10\")   Wt 93 kg (205 lb)   SpO2 95%   BMI 29.41 kg/m      Physical Exam:    General: awake and alert, comfortable  HEENT: atraumatic, no scleral injection, no nasal discharge, neck supple  Chest wall: palpation of the chest wall does not reproduce symptoms  Chest: clear to auscultation bilaterally without wheezes or crackles, non labored respirations  Cardiovascular: regular rate and rhythm, no murmurs or gallops  Abdomen: soft, nontender, no rebound or guarding, nondistended  Extremities: no deformities, edema, or tenderness  Skin: warm, dry, no rashes  Neuro: alert and oriented x 3, moving extremities x 4, ambulates without difficulty    Medical Decision Making & ED Course     Hipolito Stout is a 64 year old male presenting with chest pain. Differential includes acute coronary syndrome, pulmonary embolism, aortic " dissection, pneumonia, esophageal spasm, gastroesophageal reflux, reactive airway disease, chest wall pain, stress or anxiety. Concern for a life threatening etiology of symptoms prompts EKG, CXR, and laboratory evaluation. EKG reveals no acute ischemia. History and exam suggest a low likelihood of pulmonary embolism, aortic dissection, or pulmonary pathology as the etiology of this patient's pain. Given the patient's HEART score, age, risk factors, and history of present illness including onset of current symptoms, a single initial troponin is insufficient to rule out acute coronary syndrome and he requires a serial troponin to make acute coronary syndrome sufficiently unlikely to allow discharge home.     Serial troponin is negative and the patient is stable for outpatient management with likely diagnosis of atypical CP. Recommended follow up with primary care provider within 72 hours to discuss possible stress testing. Patient given instructions on follow-up and warning signs for which to return to ED. All questions were answered and the patient is comfortable with plan for discharge. The patient was discharged in stable condition.    I have reviewed the patient's medical record, previous medical imaging and chest x-ray from today, ECG, laboratory studies    Diagnosis & Disposition     Diagnosis:  1. Chest pain, unspecified type        Disposition:  Home       Elisha Benavides MD  10/18/19 5346

## 2019-10-18 NOTE — ED TRIAGE NOTES
States extreme fatigue for a long time, worse in last week. At times lightheaded and weakness, labored breathing. Lightheaded when picking stuff up from floor. Worse yesterday then today. Pain in back, shoulder blade region, states across back.

## 2019-10-18 NOTE — ED AVS SNAPSHOT
[facility]  [address]  [place] [address]  Phone:  [phone]  Fax:  [phone]                                    Hipolito Stout   MRN: [id number]    Department:  [facility]   Date of Visit:  10/18/2019           After Visit Summary Signature Page    I have received my discharge instructions, and my questions have been answered. I have discussed any challenges I see with this plan with the nurse or doctor.    ..........................................................................................................................................  Patient/Patient Representative Signature      ..........................................................................................................................................  Patient Representative Print Name and Relationship to Patient    ..................................................               ................................................  Date                                   Time    ..........................................................................................................................................  Reviewed by Signature/Title    ...................................................              ..............................................  Date                                               Time          22EPIC Rev 08/18

## 2020-02-08 ENCOUNTER — HEALTH MAINTENANCE LETTER (OUTPATIENT)
Age: 65
End: 2020-02-08

## 2020-09-05 ENCOUNTER — ALLIED HEALTH/NURSE VISIT (OUTPATIENT)
Dept: FAMILY MEDICINE | Facility: OTHER | Age: 65
End: 2020-09-05
Payer: MEDICARE

## 2020-09-05 DIAGNOSIS — Z20.822 ENCOUNTER FOR LABORATORY TESTING FOR COVID-19 VIRUS: Primary | ICD-10-CM

## 2020-09-05 PROCEDURE — 99207 ZZC NO CHARGE NURSE ONLY: CPT

## 2020-09-05 PROCEDURE — U0003 INFECTIOUS AGENT DETECTION BY NUCLEIC ACID (DNA OR RNA); SEVERE ACUTE RESPIRATORY SYNDROME CORONAVIRUS 2 (SARS-COV-2) (CORONAVIRUS DISEASE [COVID-19]), AMPLIFIED PROBE TECHNIQUE, MAKING USE OF HIGH THROUGHPUT TECHNOLOGIES AS DESCRIBED BY CMS-2020-01-R: HCPCS | Mod: ZL

## 2020-09-05 PROCEDURE — C9803 HOPD COVID-19 SPEC COLLECT: HCPCS

## 2020-09-05 NOTE — NURSING NOTE
"Chief Complaint   Patient presents with     Covid 19 Testing     Patient swabbed for COVID-19 testing.  Marie Hutson LPN on 9/5/2020 at 12:14 PM      Initial There were no vitals taken for this visit. Estimated body mass index is 29.41 kg/m  as calculated from the following:    Height as of 10/18/19: 1.778 m (5' 10\").    Weight as of 10/18/19: 93 kg (205 lb).  Medication Reconciliation: complete    Marie Hutson LPN  "

## 2020-09-06 LAB
SARS-COV-2 RNA SPEC QL NAA+PROBE: NOT DETECTED
SPECIMEN SOURCE: NORMAL

## 2020-09-23 ENCOUNTER — HOSPITAL ENCOUNTER (OUTPATIENT)
Dept: MRI IMAGING | Facility: OTHER | Age: 65
Discharge: HOME | End: 2020-09-23
Attending: FAMILY MEDICINE | Admitting: FAMILY MEDICINE
Payer: MEDICARE

## 2020-09-23 DIAGNOSIS — D33.3 VESTIBULAR SCHWANNOMA (H): ICD-10-CM

## 2020-09-23 PROCEDURE — A9575 INJ GADOTERATE MEGLUMI 0.1ML: HCPCS | Performed by: RADIOLOGY

## 2020-09-23 PROCEDURE — 25500064 ZZH RX 255 OP 636: Performed by: RADIOLOGY

## 2020-09-23 PROCEDURE — 70543 MRI ORBT/FAC/NCK W/O &W/DYE: CPT

## 2020-09-23 RX ORDER — GADOTERATE MEGLUMINE 376.9 MG/ML
20 INJECTION INTRAVENOUS ONCE
Status: COMPLETED | OUTPATIENT
Start: 2020-09-23 | End: 2020-09-23

## 2020-09-23 RX ADMIN — GADOTERATE MEGLUMINE 18 ML: 376.9 INJECTION INTRAVENOUS at 09:48

## 2020-11-08 ENCOUNTER — HEALTH MAINTENANCE LETTER (OUTPATIENT)
Age: 65
End: 2020-11-08

## 2020-12-28 ENCOUNTER — HOSPITAL ENCOUNTER (EMERGENCY)
Facility: OTHER | Age: 65
Discharge: HOME | End: 2020-12-28
Attending: PHYSICIAN ASSISTANT | Admitting: PHYSICIAN ASSISTANT
Payer: MEDICARE

## 2020-12-28 ENCOUNTER — APPOINTMENT (OUTPATIENT)
Dept: CT IMAGING | Facility: OTHER | Age: 65
End: 2020-12-28
Attending: PHYSICIAN ASSISTANT
Payer: MEDICARE

## 2020-12-28 VITALS
BODY MASS INDEX: 29.27 KG/M2 | DIASTOLIC BLOOD PRESSURE: 84 MMHG | RESPIRATION RATE: 18 BRPM | TEMPERATURE: 97.6 F | WEIGHT: 204 LBS | OXYGEN SATURATION: 94 % | HEART RATE: 67 BPM | SYSTOLIC BLOOD PRESSURE: 143 MMHG

## 2020-12-28 DIAGNOSIS — R06.02 SOB (SHORTNESS OF BREATH): ICD-10-CM

## 2020-12-28 DIAGNOSIS — R39.198 DIFFICULTY URINATING: ICD-10-CM

## 2020-12-28 DIAGNOSIS — K57.32 DIVERTICULITIS OF COLON: ICD-10-CM

## 2020-12-28 LAB
ALBUMIN SERPL-MCNC: 4 G/DL (ref 3.5–5.7)
ALBUMIN UR-MCNC: 20 MG/DL
ALP SERPL-CCNC: 46 U/L (ref 34–104)
ALT SERPL W P-5'-P-CCNC: 22 U/L (ref 7–52)
ANION GAP SERPL CALCULATED.3IONS-SCNC: 8 MMOL/L (ref 3–14)
APPEARANCE UR: CLEAR
APTT PPP: 28 SEC (ref 22–37)
AST SERPL W P-5'-P-CCNC: 21 U/L (ref 13–39)
BASOPHILS # BLD AUTO: 0 10E9/L (ref 0–0.2)
BASOPHILS NFR BLD AUTO: 0.3 %
BILIRUB SERPL-MCNC: 1 MG/DL (ref 0.3–1)
BILIRUB UR QL STRIP: NEGATIVE
BUN SERPL-MCNC: 13 MG/DL (ref 7–25)
CALCIUM SERPL-MCNC: 9.1 MG/DL (ref 8.6–10.3)
CHLORIDE SERPL-SCNC: 106 MMOL/L (ref 98–107)
CO2 SERPL-SCNC: 25 MMOL/L (ref 21–31)
COLOR UR AUTO: YELLOW
CREAT SERPL-MCNC: 1.02 MG/DL (ref 0.7–1.3)
DIFFERENTIAL METHOD BLD: ABNORMAL
EOSINOPHIL # BLD AUTO: 0.1 10E9/L (ref 0–0.7)
EOSINOPHIL NFR BLD AUTO: 0.6 %
ERYTHROCYTE [DISTWIDTH] IN BLOOD BY AUTOMATED COUNT: 12.4 % (ref 10–15)
FLUABV+SARS-COV-2+RSV PNL RESP NAA+PROBE: NEGATIVE
FLUABV+SARS-COV-2+RSV PNL RESP NAA+PROBE: NEGATIVE
GFR SERPL CREATININE-BSD FRML MDRD: 73 ML/MIN/{1.73_M2}
GLUCOSE SERPL-MCNC: 107 MG/DL (ref 70–105)
GLUCOSE UR STRIP-MCNC: NEGATIVE MG/DL
HCT VFR BLD AUTO: 41.1 % (ref 40–53)
HGB BLD-MCNC: 13.7 G/DL (ref 13.3–17.7)
HGB UR QL STRIP: NEGATIVE
IMM GRANULOCYTES # BLD: 0 10E9/L (ref 0–0.4)
IMM GRANULOCYTES NFR BLD: 0.3 %
INR PPP: 0.93 (ref 0.86–1.14)
KETONES UR STRIP-MCNC: NEGATIVE MG/DL
LABORATORY COMMENT REPORT: NORMAL
LACTATE BLD-SCNC: 1.2 MMOL/L (ref 0.7–2)
LEUKOCYTE ESTERASE UR QL STRIP: NEGATIVE
LIPASE SERPL-CCNC: 6 U/L (ref 11–82)
LYMPHOCYTES # BLD AUTO: 1.5 10E9/L (ref 0.8–5.3)
LYMPHOCYTES NFR BLD AUTO: 11.8 %
MCH RBC QN AUTO: 28.8 PG (ref 26.5–33)
MCHC RBC AUTO-ENTMCNC: 33.3 G/DL (ref 31.5–36.5)
MCV RBC AUTO: 86 FL (ref 78–100)
MONOCYTES # BLD AUTO: 0.7 10E9/L (ref 0–1.3)
MONOCYTES NFR BLD AUTO: 5.7 %
NEUTROPHILS # BLD AUTO: 10.2 10E9/L (ref 1.6–8.3)
NEUTROPHILS NFR BLD AUTO: 81.3 %
NITRATE UR QL: NEGATIVE
NT-PROBNP SERPL-MCNC: 57 PG/ML (ref 0–100)
PH UR STRIP: NEGATIVE PH (ref 5–7)
PLATELET # BLD AUTO: 159 10E9/L (ref 150–450)
POTASSIUM SERPL-SCNC: 4.3 MMOL/L (ref 3.5–5.1)
PROT SERPL-MCNC: 6.9 G/DL (ref 6.4–8.9)
RBC # BLD AUTO: 4.76 10E12/L (ref 4.4–5.9)
RSV RNA SPEC QL NAA+PROBE: NEGATIVE
SARS-COV-2 RNA SPEC QL NAA+PROBE: NEGATIVE
SODIUM SERPL-SCNC: 139 MMOL/L (ref 134–144)
SOURCE: ABNORMAL
SP GR UR STRIP: >1.035 (ref 1–1.03)
SPECIMEN SOURCE: NORMAL
TROPONIN I SERPL-MCNC: 3.6 PG/ML
UROBILINOGEN UR STRIP-MCNC: 0.2 MG/DL (ref 0–2)
WBC # BLD AUTO: 12.6 10E9/L (ref 4–11)

## 2020-12-28 PROCEDURE — 80053 COMPREHEN METABOLIC PANEL: CPT | Performed by: PHYSICIAN ASSISTANT

## 2020-12-28 PROCEDURE — C9803 HOPD COVID-19 SPEC COLLECT: HCPCS | Performed by: PHYSICIAN ASSISTANT

## 2020-12-28 PROCEDURE — 84484 ASSAY OF TROPONIN QUANT: CPT | Performed by: PHYSICIAN ASSISTANT

## 2020-12-28 PROCEDURE — 99284 EMERGENCY DEPT VISIT MOD MDM: CPT | Performed by: PHYSICIAN ASSISTANT

## 2020-12-28 PROCEDURE — 71275 CT ANGIOGRAPHY CHEST: CPT

## 2020-12-28 PROCEDURE — 81003 URINALYSIS AUTO W/O SCOPE: CPT | Performed by: PHYSICIAN ASSISTANT

## 2020-12-28 PROCEDURE — 85025 COMPLETE CBC W/AUTO DIFF WBC: CPT | Performed by: PHYSICIAN ASSISTANT

## 2020-12-28 PROCEDURE — 250N000013 HC RX MED GY IP 250 OP 250 PS 637: Mod: GY | Performed by: PHYSICIAN ASSISTANT

## 2020-12-28 PROCEDURE — 83880 ASSAY OF NATRIURETIC PEPTIDE: CPT | Performed by: PHYSICIAN ASSISTANT

## 2020-12-28 PROCEDURE — 85610 PROTHROMBIN TIME: CPT | Performed by: PHYSICIAN ASSISTANT

## 2020-12-28 PROCEDURE — 255N000002 HC RX 255 OP 636: Performed by: PHYSICIAN ASSISTANT

## 2020-12-28 PROCEDURE — 87636 SARSCOV2 & INF A&B AMP PRB: CPT | Performed by: PHYSICIAN ASSISTANT

## 2020-12-28 PROCEDURE — 93010 ELECTROCARDIOGRAM REPORT: CPT | Performed by: INTERNAL MEDICINE

## 2020-12-28 PROCEDURE — 99285 EMERGENCY DEPT VISIT HI MDM: CPT | Mod: 25 | Performed by: PHYSICIAN ASSISTANT

## 2020-12-28 PROCEDURE — 96374 THER/PROPH/DIAG INJ IV PUSH: CPT | Mod: XU | Performed by: PHYSICIAN ASSISTANT

## 2020-12-28 PROCEDURE — 93005 ELECTROCARDIOGRAM TRACING: CPT | Performed by: PHYSICIAN ASSISTANT

## 2020-12-28 PROCEDURE — 83605 ASSAY OF LACTIC ACID: CPT | Performed by: PHYSICIAN ASSISTANT

## 2020-12-28 PROCEDURE — C9113 INJ PANTOPRAZOLE SODIUM, VIA: HCPCS | Performed by: PHYSICIAN ASSISTANT

## 2020-12-28 PROCEDURE — 85730 THROMBOPLASTIN TIME PARTIAL: CPT | Performed by: PHYSICIAN ASSISTANT

## 2020-12-28 PROCEDURE — 83690 ASSAY OF LIPASE: CPT | Performed by: PHYSICIAN ASSISTANT

## 2020-12-28 PROCEDURE — 250N000011 HC RX IP 250 OP 636: Performed by: PHYSICIAN ASSISTANT

## 2020-12-28 RX ORDER — ASPIRIN 81 MG/1
324 TABLET, CHEWABLE ORAL ONCE
Status: COMPLETED | OUTPATIENT
Start: 2020-12-28 | End: 2020-12-28

## 2020-12-28 RX ORDER — SUCRALFATE ORAL 1 G/10ML
1 SUSPENSION ORAL 4 TIMES DAILY
Qty: 280 ML | Refills: 0 | Status: SHIPPED | OUTPATIENT
Start: 2020-12-28 | End: 2021-01-04

## 2020-12-28 RX ORDER — ASPIRIN 81 MG/1
81 TABLET, CHEWABLE ORAL DAILY
COMMUNITY

## 2020-12-28 RX ADMIN — ASPIRIN 81 MG CHEWABLE TABLET 324 MG: 81 TABLET CHEWABLE at 11:36

## 2020-12-28 RX ADMIN — PANTOPRAZOLE SODIUM 40 MG: 40 INJECTION, POWDER, FOR SOLUTION INTRAVENOUS at 11:32

## 2020-12-28 RX ADMIN — IOHEXOL 100 ML: 350 INJECTION, SOLUTION INTRAVENOUS at 11:59

## 2020-12-28 ASSESSMENT — ENCOUNTER SYMPTOMS
ADENOPATHY: 0
BACK PAIN: 0
CHEST TIGHTNESS: 1
COUGH: 1
ABDOMINAL PAIN: 1
FEVER: 0
NAUSEA: 0
HEMATURIA: 0
CONFUSION: 0
BRUISES/BLEEDS EASILY: 0
CHILLS: 0
VOMITING: 0
SHORTNESS OF BREATH: 1
WOUND: 0

## 2020-12-28 NOTE — ED TRIAGE NOTES
"Pt presents to the ED ambulatory from home with a chief complaint of feeling SOB which started a couple days ago and upper abdominal pain which was at its worse last night. Pt described abdominal pain as \"burning and felt like a basketball\". Pt had a hard time sleeping last night. Pt denies abdominal pain at this time. Pt has been able to eat and drink, \"nauseated at times\". Pt says abdominal pain gets worse when he eats. Pt states to be passing gas and having Bowl movements.    BP (!) 155/92   Pulse 74   Temp 97.6  F (36.4  C) (Tympanic)   Resp 18   Wt 92.5 kg (204 lb)   SpO2 97%   BMI 29.27 kg/m    "

## 2020-12-28 NOTE — ED PROVIDER NOTES
"  History     Chief Complaint   Patient presents with     Shortness of Breath     Abdominal Pain     HPI  Hipolito Stout is a 65 year old male who presents to the ED ambulatory from home with a chief complaint of feeling SOB which started a couple days ago and upper abdominal pain which was at its worse last night. Pt described abdominal pain as \"burning and felt like a basketball\". Pt had a hard time sleeping last night. Pt denies abdominal pain at this time. Pt has been able to eat and drink, \"nauseated at times\". Pt says abdominal pain gets worse when he eats. Pt states to be passing gas and having Bowl movements.    Allergies:  Allergies   Allergen Reactions     Cats        Problem List:    Patient Active Problem List    Diagnosis Date Noted     LPRD (laryngopharyngeal reflux disease) 10/02/2013     Priority: Medium     Mass of oral cavity 10/02/2013     Priority: Medium     Thyroid nodule 10/02/2013     Priority: Medium     Neck pain on left side 10/02/2013     Priority: Medium        Past Medical History:    No past medical history on file.    Past Surgical History:    Past Surgical History:   Procedure Laterality Date     ABDOMEN SURGERY  08/2012    hernia repair     CHOLECYSTECTOMY       EYE SURGERY      cataract bilateral     ORTHOPEDIC SURGERY      carpal tunnel release bilateral       Family History:    Family History   Problem Relation Age of Onset     Heart Disease Mother      Cancer - colorectal Mother      Heart Disease Father      Diabetes Father      Cancer Maternal Grandfather         prostate     Heart Disease Paternal Grandmother      Diabetes Paternal Grandmother      Cerebrovascular Disease Paternal Grandfather        Social History:  Marital Status:   [2]  Social History     Tobacco Use     Smoking status: Former Smoker     Packs/day: 0.00     Smokeless tobacco: Never Used   Substance Use Topics     Alcohol use: Yes     Comment: social     Drug use: No        Medications:         " amoxicillin-clavulanate (AUGMENTIN) 875-125 MG tablet       aspirin (ASA) 81 MG chewable tablet       Metoprolol Tartrate (LOPRESSOR PO)       omeprazole (PRILOSEC) 20 MG DR capsule       rosuvastatin (CRESTOR) 40 MG tablet       sucralfate (CARAFATE) 1 GM/10ML suspension       Cholecalciferol (VITAMIN D) 2000 UNITS CAPS       Cyanocobalamin (VITAMIN B-12 PO)       FISH OIL       Multiple Vitamins-Minerals (MULTIVITAMIN OR)       VITAMIN E          Review of Systems   Constitutional: Negative for chills and fever.   HENT: Negative for congestion.    Eyes: Negative for visual disturbance.   Respiratory: Positive for cough, chest tightness and shortness of breath.    Cardiovascular: Negative for chest pain.   Gastrointestinal: Positive for abdominal pain. Negative for nausea and vomiting.   Genitourinary: Negative for hematuria.   Musculoskeletal: Negative for back pain.   Skin: Negative for rash and wound.   Neurological: Negative for syncope.   Hematological: Negative for adenopathy. Does not bruise/bleed easily.   Psychiatric/Behavioral: Negative for confusion.       Physical Exam   BP: (!) 155/92  Pulse: 74  Temp: 97.6  F (36.4  C)  Resp: 18  Weight: 92.5 kg (204 lb)  SpO2: 97 %      Physical Exam  Constitutional:       General: He is not in acute distress.     Appearance: He is well-developed. He is not diaphoretic.   HENT:      Head: Normocephalic and atraumatic.   Eyes:      General: No scleral icterus.     Extraocular Movements: Extraocular movements intact.      Conjunctiva/sclera: Conjunctivae normal.      Pupils: Pupils are equal, round, and reactive to light.   Neck:      Musculoskeletal: Neck supple.   Cardiovascular:      Rate and Rhythm: Normal rate and regular rhythm.   Pulmonary:      Effort: Pulmonary effort is normal.      Breath sounds: Normal breath sounds.   Abdominal:      Palpations: Abdomen is soft.      Tenderness: There is abdominal tenderness. There is no guarding or rebound.      Comments:  Slight TTP epigastric region   Musculoskeletal:         General: No deformity.   Lymphadenopathy:      Cervical: No cervical adenopathy.   Skin:     General: Skin is warm and dry.      Findings: No rash.   Neurological:      Mental Status: He is alert and oriented to person, place, and time. Mental status is at baseline.   Psychiatric:         Mood and Affect: Mood normal.         Behavior: Behavior normal.         ED Course        Procedures          EKG read at 1128. HR 74, NSR no ST changes.    Critical Care time:  none               Results for orders placed or performed during the hospital encounter of 12/28/20 (from the past 24 hour(s))   CBC with platelets differential   Result Value Ref Range    WBC 12.6 (H) 4.0 - 11.0 10e9/L    RBC Count 4.76 4.4 - 5.9 10e12/L    Hemoglobin 13.7 13.3 - 17.7 g/dL    Hematocrit 41.1 40.0 - 53.0 %    MCV 86 78 - 100 fl    MCH 28.8 26.5 - 33.0 pg    MCHC 33.3 31.5 - 36.5 g/dL    RDW 12.4 10.0 - 15.0 %    Platelet Count 159 150 - 450 10e9/L    Diff Method Automated Method     % Neutrophils 81.3 %    % Lymphocytes 11.8 %    % Monocytes 5.7 %    % Eosinophils 0.6 %    % Basophils 0.3 %    % Immature Granulocytes 0.3 %    Absolute Neutrophil 10.2 (H) 1.6 - 8.3 10e9/L    Absolute Lymphocytes 1.5 0.8 - 5.3 10e9/L    Absolute Monocytes 0.7 0.0 - 1.3 10e9/L    Absolute Eosinophils 0.1 0.0 - 0.7 10e9/L    Absolute Basophils 0.0 0.0 - 0.2 10e9/L    Abs Immature Granulocytes 0.0 0 - 0.4 10e9/L   Comprehensive metabolic panel   Result Value Ref Range    Sodium 139 134 - 144 mmol/L    Potassium 4.3 3.5 - 5.1 mmol/L    Chloride 106 98 - 107 mmol/L    Carbon Dioxide 25 21 - 31 mmol/L    Anion Gap 8 3 - 14 mmol/L    Glucose 107 (H) 70 - 105 mg/dL    Urea Nitrogen 13 7 - 25 mg/dL    Creatinine 1.02 0.70 - 1.30 mg/dL    GFR Estimate 73 >60 mL/min/[1.73_m2]    GFR Estimate If Black 88 >60 mL/min/[1.73_m2]    Calcium 9.1 8.6 - 10.3 mg/dL    Bilirubin Total 1.0 0.3 - 1.0 mg/dL    Albumin 4.0 3.5 -  5.7 g/dL    Protein Total 6.9 6.4 - 8.9 g/dL    Alkaline Phosphatase 46 34 - 104 U/L    ALT 22 7 - 52 U/L    AST 21 13 - 39 U/L   Lipase   Result Value Ref Range    Lipase 6 (L) 11 - 82 U/L   Lactic acid whole blood   Result Value Ref Range    Lactic Acid 1.2 0.7 - 2.0 mmol/L   Troponin GH   Result Value Ref Range    Troponin 3.6 <34.0 pg/mL   Nt probnp inpatient (BNP)   Result Value Ref Range    N-Terminal Pro BNP Inpatient 57 0 - 100 pg/mL   INR   Result Value Ref Range    INR 0.93 0.86 - 1.14   Partial thromboplastin time   Result Value Ref Range    PTT 28 22 - 37 sec   CT Chest (PE) Abdomen Pelvis w Contrast    Narrative    CT CHEST PE ABDOMEN PELVIS W CONTRAST    CLINICAL HISTORY: Male, age 65 years, SOB, epigastric pain 10/10,  abdominal bloating;    Comparison:  CT scan of the chest 5/5/2015; CT scan abdomen and pelvis  12/4/2015    TECHNIQUE:  CT angiogram was performed of the chest, abdomen and  pelvis  after the administration of IV  contrast.   Sagittal, coronal, axial and MIP reconstructions were reviewed.     FINDINGS:  Chest CT:   CTA chest: Excellent quality examination demonstrates no evidence of  pulmonary embolus. Thoracic aorta is intact. Small volume of mixed  calcified and noncalcified plaque seen throughout the thoracic aorta.  Assess artery calcifications are also seen within the coronary  arteries.    LUNGS: Mild centrilobular emphysema again seen throughout both lungs.  There are numerous tiny punctate nodules again seen throughout the  parenchyma both lungs. Peripheral areas of interstitial thickening,  atelectasis and scarring again seen throughout both lungs, most  evident in the dependent portions of the right lower lobe.    Lymph nodes mildly enlarged lymph nodes are seen in the subcarinal  regions as well as the right hilum. Thyroid gland is normal. Esophagus  is also normal.    Abdomen/Pelvis CT:  ; The stomach and duodenum are normal.    Liver: Normal.    Gallbladder: Gallbladder is  surgically absent. The biliary tree is  grossly normal.    Spleen: Normal    Pancreas: Normal.    Adrenal glands: Normal.    Kidneys: Low dense lesions are similar in appearance consistent with  cortical and parapelvic cysts.    Ureters: Normal.  Urinary bladder: Normal.    Large and small bowel: Diverticulosis of the colon. There is very mild  fat stranding adjacent to some of the diverticula of the sigmoid  colon. No evidence of perforation or abscess.    Lymph nodes: Normal.    Bony structures: No acute abnormality. There is ankylosis of the SI  joints. Mild degenerative changes are seen throughout the spine.      Impression    IMPRESSION:   Excellent quality CTA demonstrates no evidence of pulmonary embolus.  Thoracic aorta is intact.    Mild sigmoid diverticulitis. No evidence of apparent perforation or  abscess.    Chronic changes otherwise throughout the chest, abdomen and pelvis as  described above.    CELESTINO LIMA MD   UA reflex to Microscopic   Result Value Ref Range    Color Urine Yellow     Appearance Urine Clear     Glucose Urine Negative NEG^Negative mg/dL    Bilirubin Urine Negative NEG^Negative    Ketones Urine Negative NEG^Negative mg/dL    Specific Gravity Urine >1.035 (H) 1.003 - 1.035    Blood Urine Negative NEG^Negative    pH Urine Negative 5.0 - 7.0 pH    Protein Albumin Urine 20 (A) NEG^Negative mg/dL    Urobilinogen mg/dL 0.2 0.0 - 2.0 mg/dL    Nitrite Urine Negative NEG^Negative    Leukocyte Esterase Urine Negative NEG^Negative    Source Midstream Urine        Medications   pantoprazole (PROTONIX) IV push injection 40 mg (40 mg Intravenous Given 12/28/20 1132)   aspirin (ASA) chewable tablet 324 mg (324 mg Oral Given 12/28/20 1136)   iohexol (OMNIPAQUE) 350 mg/mL solution 100 mL (100 mLs Intravenous Given 12/28/20 1159)       Assessments & Plan (with Medical Decision Making)   Nontoxic and in no acute distress.  Heart, lung, bowel sounds are normal.  Abdomen soft but general tenderness  throughout slightly increased epigastric and left side, no guarding or rebound.  He has good equal peripheral pulses in all extremities, no increased work of breathing.  Vital signs are stable and he is afebrile.    He has a WBC 12.6 otherwise his lab work appears excellent and includes a negative troponin, normal BNP, normal lactic acid, and noninfectious appearing urinalysis, normal lipase Covid and flu testing is pending.    CT chest and abdomen read as:  Excellent quality CTA demonstrates no evidence of pulmonary embolus.  Thoracic aorta is intact.     Mild sigmoid diverticulitis. No evidence of apparent perforation or  Abscess.      We will treat the patient's diverticulitis with Augmentin.  He will continue with his home omeprazole and we will add Carafate.  I still feel that we should place referrals for the patient to obtain an echocardiogram and to follow-up with his PCP.  Right before he leaves he does tell me that he is having more difficulty urinating and a referral is placed for him to follow-up with urology for possible BPH.    Strict return precautions are given to the pt, they will return if symptoms are worsening or concerning. The pt understands and agrees with the plan and they are discharged.     Wally Evans PA-C    I have reviewed the nursing notes.    I have reviewed the findings, diagnosis, plan and need for follow up with the patient.       Discharge Medication List as of 12/28/2020  1:53 PM      START taking these medications    Details   amoxicillin-clavulanate (AUGMENTIN) 875-125 MG tablet Take 1 tablet by mouth 3 times daily for 10 days, Disp-30 tablet, R-0, E-Prescribe             Final diagnoses:   Diverticulitis of colon   SOB (shortness of breath)   Difficulty urinating       12/28/2020   [facility] AND Wally Olsen PA  12/28/20 9921

## 2020-12-28 NOTE — DISCHARGE INSTRUCTIONS
Get plenty of fluids and rest.  As we discussed it appears you are suffering from a diverticulitis, is difficult to say if this is causing all your problems or not.  The rest of your lab work looked fairly well and we did discuss that your white blood cell count was slightly elevated.  Referrals are placed for you to obtain an outpatient echocardiogram, follow-up with PCP for reassessment, as well as to follow-up with urology for most likely an enlarged prostate.  Return to the ED if there are worsening or concerning symptoms.

## 2020-12-28 NOTE — ED AVS SNAPSHOT
[facility]  [address]  [place] [address]  Phone: [phone]  Fax: [phone]                                    Hipolito Stout   MRN: [id number]    Department: [facility]   Date of Visit: 12/28/2020           After Visit Summary Signature Page    I have received my discharge instructions, and my questions have been answered. I have discussed any challenges I see with this plan with the nurse or doctor.    ..........................................................................................................................................  Patient/Patient Representative Signature      ..........................................................................................................................................  Patient Representative Print Name and Relationship to Patient    ..................................................               ................................................  Date                                   Time    ..........................................................................................................................................  Reviewed by Signature/Title    ...................................................              ..............................................  Date                                               Time          22EPIC Rev 08/18

## 2020-12-29 LAB — INTERPRETATION ECG - MUSE: NORMAL

## 2020-12-30 ENCOUNTER — OFFICE VISIT (OUTPATIENT)
Dept: UROLOGY | Facility: OTHER | Age: 65
End: 2020-12-30
Attending: PHYSICIAN ASSISTANT
Payer: COMMERCIAL

## 2020-12-30 VITALS
RESPIRATION RATE: 16 BRPM | DIASTOLIC BLOOD PRESSURE: 82 MMHG | BODY MASS INDEX: 29.7 KG/M2 | SYSTOLIC BLOOD PRESSURE: 132 MMHG | WEIGHT: 207 LBS | HEART RATE: 67 BPM

## 2020-12-30 DIAGNOSIS — R39.198 DIFFICULTY URINATING: ICD-10-CM

## 2020-12-30 DIAGNOSIS — N40.1 BENIGN PROSTATIC HYPERPLASIA WITH URINARY FREQUENCY: Primary | ICD-10-CM

## 2020-12-30 DIAGNOSIS — R35.0 BENIGN PROSTATIC HYPERPLASIA WITH URINARY FREQUENCY: Primary | ICD-10-CM

## 2020-12-30 DIAGNOSIS — N52.9 ED (ERECTILE DYSFUNCTION) OF ORGANIC ORIGIN: ICD-10-CM

## 2020-12-30 PROCEDURE — G0463 HOSPITAL OUTPT CLINIC VISIT: HCPCS | Performed by: UROLOGY

## 2020-12-30 PROCEDURE — 99214 OFFICE O/P EST MOD 30 MIN: CPT | Performed by: UROLOGY

## 2020-12-30 RX ORDER — TAMSULOSIN HYDROCHLORIDE 0.4 MG/1
0.4 CAPSULE ORAL EVERY EVENING
Qty: 90 CAPSULE | Refills: 3 | Status: SHIPPED | OUTPATIENT
Start: 2020-12-30

## 2020-12-30 ASSESSMENT — PAIN SCALES - GENERAL: PAINLEVEL: NO PAIN (0)

## 2020-12-30 NOTE — PROGRESS NOTES
Type of Visit  EST    Chief Complaint  Urinary frequency  ED    HPI  Mr. Stout is a 65 year old male who follows up with urinary complaints and ED.  His urinary complaints involve urgency with frequency and nocturia as well as a weak stream with sense of incomplete emptying.  The patient has never taken medication for the urinary symptoms.  He admits to a very low fluid intake overall on a day-to-day basis.  He drinks 1 to 3 cups of coffee in the morning and no other caffeine during the day and no other alcohol intake.  He urinates before bedtime.  He still wakes every hour to every 3 hours at night to void.  He denies gross hematuria and dysuria today.    He also has experienced erectile dysfunction that has progressively worsened over the last 3 to 4 years.  He is motivated for treatment.  He has issues with achieving and maintaining an erection.  He has not used mechanical assistance in the past.  He does not take nitroglycerin.      Family History  Family History   Problem Relation Age of Onset     Heart Disease Mother      Cancer - colorectal Mother      Heart Disease Father      Diabetes Father      Cancer Maternal Grandfather         prostate     Heart Disease Paternal Grandmother      Diabetes Paternal Grandmother      Cerebrovascular Disease Paternal Grandfather        Review of Systems  I personally reviewed the ROS with the patient.    Nursing Notes:   Nimo Schneider LPN  12/30/2020  1:58 PM  Signed  Pt presents to clinic for difficulty urinating    Review of Systems:    Weight loss:    No     Recent fever/chills:  No   Night sweats:   No  Current skin rash:  No   Recent hair loss:  No  Heat intolerance:  No   Cold intolerance:  No  Chest pain:   No   Palpitations:   No  Shortness of breath:  No   Wheezing:   No  Constipation:    No   Diarrhea:   No   Nausea:   No   Vomiting:   No   Kidney/side pain:  No   Back pain:   No  Frequent headaches:  No   Dizziness:     No  Leg swelling:   Yes   Calf  pain:    No            Physical Exam  Vitals:    12/30/20 1354   BP: 132/82   BP Location: Left arm   Patient Position: Sitting   Cuff Size: Adult Regular   Pulse: 67   Resp: 16   Weight: 93.9 kg (207 lb)     Constitutional: No acute distress.  Alert and cooperative   Head: NCAT  Eyes: Conjunctivae normal  Cardiovascular: Regular rate.  Pulmonary/Chest: Respirations are even and non-labored bilaterally, no audible wheezing  Abdominal: Soft. No distension, tenderness, masses or guarding.   Neurological: A + O x 3.  Cranial Nerves II-XII grossly intact.  Extremities: KELY x 4, Warm. No clubbing.  No cyanosis.    Skin: Pink, warm and dry.  No visible rashes noted.  Psychiatric:  Normal mood and affect  Back:  No left CVA tenderness.  No right CVA tenderness.  Genitourinary:  Nonpalpable bladder    Labs  Results for SHAYY STOUT (MRN [id number]) as of 12/30/2020 14:00   12/28/2020 13:10   Color Urine Yellow   Appearance Urine Clear   Glucose Urine Negative   Bilirubin Urine Negative   Ketones Urine Negative   Specific Gravity Urine >1.035 (H)   pH Urine Negative   Protein Albumin Urine 20 (A)   Urobilinogen mg/dL 0.2   Nitrite Urine Negative   Blood Urine Negative   Leukocyte Esterase Urine Negative   Source Midstream Urine       OSH labs  PSA over last 9 years all <1, most recently 5/2019    Stone analysis  2011  100% Calcium oxalate monohydrate    Imaging  CT a/p   12/2/2015  I personally reviewed and interpreted the images and report.  IMPRESSION:  NO ACUTE INTRA-ABDOMINAL PROCESS.    Post-Void Residual  A post-void residual was measured by ultrasonic bladder scanner.  0 mL (previously recorded)    Assessment & Plan  Mr. Stout is a 65 year old male who follows up with mixed urinary symptoms and ED.  Explained that the patient has both storage and emptying symptoms.  He complains of urgency with frequency and nocturia as well as weak stream and a sense of incomplete emptying.    Regarding the overactive  bladder symptoms I strongly suggested he increase his water intake.  He admits to a very low fluid intake overall and his last UA reveals highly concentrated urine.  I explained that this alone can exacerbate storage symptoms.    Regarding obstruction I recommended a trial of Flomax.    We discussed side effects of Flomax including, but not limited to, retrograde ejaculation, congestion and lightheadedness.    Plan  Start Flomax 0.4mg every evening  Follow up in 1 month to continue our fairly thorough discussion regarding erectile dysfunction treatment options including intracavernosal injections

## 2021-01-12 ENCOUNTER — HOSPITAL ENCOUNTER (OUTPATIENT)
Dept: CARDIOLOGY | Facility: OTHER | Age: 66
Discharge: HOME | End: 2021-01-12
Attending: PHYSICIAN ASSISTANT | Admitting: PHYSICIAN ASSISTANT
Payer: MEDICARE

## 2021-01-12 DIAGNOSIS — R06.02 SOB (SHORTNESS OF BREATH): ICD-10-CM

## 2021-01-12 PROCEDURE — 93306 TTE W/DOPPLER COMPLETE: CPT | Mod: 26 | Performed by: STUDENT IN AN ORGANIZED HEALTH CARE EDUCATION/TRAINING PROGRAM

## 2021-01-12 PROCEDURE — 93306 TTE W/DOPPLER COMPLETE: CPT

## 2021-01-28 ENCOUNTER — OFFICE VISIT (OUTPATIENT)
Dept: UROLOGY | Facility: OTHER | Age: 66
End: 2021-01-28
Attending: UROLOGY
Payer: COMMERCIAL

## 2021-01-28 VITALS
WEIGHT: 205.8 LBS | SYSTOLIC BLOOD PRESSURE: 126 MMHG | DIASTOLIC BLOOD PRESSURE: 78 MMHG | HEART RATE: 88 BPM | RESPIRATION RATE: 16 BRPM | BODY MASS INDEX: 29.53 KG/M2

## 2021-01-28 DIAGNOSIS — N52.9 ED (ERECTILE DYSFUNCTION) OF ORGANIC ORIGIN: Primary | ICD-10-CM

## 2021-01-28 PROCEDURE — 99213 OFFICE O/P EST LOW 20 MIN: CPT | Performed by: UROLOGY

## 2021-01-28 PROCEDURE — G0463 HOSPITAL OUTPT CLINIC VISIT: HCPCS

## 2021-01-28 RX ORDER — SILDENAFIL CITRATE 20 MG/1
TABLET ORAL
Qty: 30 TABLET | Refills: 11 | Status: SHIPPED | OUTPATIENT
Start: 2021-01-28

## 2021-01-28 ASSESSMENT — PAIN SCALES - GENERAL: PAINLEVEL: NO PAIN (0)

## 2021-01-28 NOTE — NURSING NOTE
Pt presents to clinic for a one month follow up for difficulty urinating    Review of Systems:    Weight loss:    No     Recent fever/chills:  No   Night sweats:   No  Current skin rash:  No   Recent hair loss:  No  Heat intolerance:  No   Cold intolerance:  No  Chest pain:   No   Palpitations:   Yes  Shortness of breath:  No   Wheezing:   No  Constipation:    Yes   Diarrhea:   No   Nausea:   No   Vomiting:   No   Kidney/side pain:  No   Back pain:   No  Frequent headaches:  No   Dizziness:     No  Leg swelling:   Yes   Calf pain:    No

## 2021-01-28 NOTE — PROGRESS NOTES
Type of Visit  EST    Chief Complaint  Urinary frequency  ED    HPI  Mr. Stout is a 65 year old male who follows up with urinary complaints and ED.  His urinary complaints involve urgency with frequency and nocturia as well as a weak stream with sense of incomplete emptying.  He admits to a very low fluid intake overall on a day-to-day basis.  He drinks 1 to 3 cups of coffee in the morning and no other caffeine during the day and no other alcohol intake.  He urinates before bedtime.  He still wakes every hour to every 3 hours at night to void.  All of the symptoms are stable from last visit 1 month ago.  1 month ago the patient started Flomax 0.4 mg once daily.  He has noticed no benefit in the last month.  He continues to deny gross hematuria and dysuria.  He also did not experience any side effects.    He also has experienced erectile dysfunction that has progressively worsened over the last 3 to 4 years.  He is motivated for treatment.  He has issues with achieving and maintaining an erection.  He has not used mechanical assistance in the past.  He does not take nitroglycerin.  He is open to trying medication.  He has never tried PDE 5 inhibitor in the past.      Family History  Family History   Problem Relation Age of Onset     Heart Disease Mother      Cancer - colorectal Mother      Heart Disease Father      Diabetes Father      Cancer Maternal Grandfather         prostate     Heart Disease Paternal Grandmother      Diabetes Paternal Grandmother      Cerebrovascular Disease Paternal Grandfather        Review of Systems  I personally reviewed the ROS with the patient.    Nursing Notes:   Nimo Schneider LPN  1/28/2021  9:43 AM  Signed  Pt presents to clinic for a one month follow up for difficulty urinating    Review of Systems:    Weight loss:    No     Recent fever/chills:  No   Night sweats:   No  Current skin rash:  No   Recent hair loss:  No  Heat intolerance:  No   Cold intolerance:  No  Chest  pain:   No   Palpitations:   Yes  Shortness of breath:  No   Wheezing:   No  Constipation:    Yes   Diarrhea:   No   Nausea:   No   Vomiting:   No   Kidney/side pain:  No   Back pain:   No  Frequent headaches:  No   Dizziness:     No  Leg swelling:   Yes   Calf pain:    No          Physical Exam  Vitals:    01/28/21 0940   BP: 126/78   BP Location: Right arm   Patient Position: Sitting   Cuff Size: Adult Regular   Pulse: 88   Resp: 16   Weight: 93.4 kg (205 lb 12.8 oz)     Constitutional: No acute distress.  Alert and cooperative   Head: NCAT  Eyes: Conjunctivae normal  Cardiovascular: Regular rate.  Pulmonary/Chest: Respirations are even and non-labored bilaterally, no audible wheezing  Abdominal: Soft. No distension, tenderness, masses or guarding.   Neurological: A + O x 3.  Cranial Nerves II-XII grossly intact.  Extremities: KELY x 4, Warm. No clubbing.  No cyanosis.    Skin: Pink, warm and dry.  No visible rashes noted.  Psychiatric:  Normal mood and affect  Back:  No left CVA tenderness.  No right CVA tenderness.  Genitourinary:  Nonpalpable bladder    Labs  Results for SHAYY STOUT (MRN [id number]) as of 12/30/2020 14:00   12/28/2020 13:10   Color Urine Yellow   Appearance Urine Clear   Glucose Urine Negative   Bilirubin Urine Negative   Ketones Urine Negative   Specific Gravity Urine >1.035 (H)   pH Urine Negative   Protein Albumin Urine 20 (A)   Urobilinogen mg/dL 0.2   Nitrite Urine Negative   Blood Urine Negative   Leukocyte Esterase Urine Negative   Source Midstream Urine     OSH labs  PSA over last 9 years all <1, most recently 5/2019    Stone analysis  2011  100% Calcium oxalate monohydrate    Imaging  CT a/p   12/2/2015  I personally reviewed and interpreted the images and report.  IMPRESSION:  NO ACUTE INTRA-ABDOMINAL PROCESS.    Post-Void Residual  A post-void residual was measured by ultrasonic bladder scanner.  0 mL (previously recorded)    Assessment & Plan  Mr. Stout is a 65 year old  male who follows up with mixed urinary symptoms and ED.  Given he has not benefited from Flomax I recommended he discontinue.    Regarding erectile dysfunction we discussed medication as well as penile constriction band to help with maintaining the erection.    Plan  Discontinue Flomax  Start sildenafil 60-100mg by mouth 1 hour prior to sexual activity  Follow-up as needed

## 2021-03-28 ENCOUNTER — HEALTH MAINTENANCE LETTER (OUTPATIENT)
Age: 66
End: 2021-03-28

## 2021-04-27 ENCOUNTER — TELEPHONE (OUTPATIENT)
Dept: NUCLEAR MEDICINE | Facility: CLINIC | Age: 66
End: 2021-04-27

## 2021-08-04 ENCOUNTER — ALLIED HEALTH/NURSE VISIT (OUTPATIENT)
Dept: FAMILY MEDICINE | Facility: OTHER | Age: 66
End: 2021-08-04
Attending: FAMILY MEDICINE
Payer: MEDICARE

## 2021-08-04 DIAGNOSIS — J02.9 SORE THROAT: Primary | ICD-10-CM

## 2021-08-04 LAB — SARS-COV-2 RNA RESP QL NAA+PROBE: NEGATIVE

## 2021-08-04 PROCEDURE — U0003 INFECTIOUS AGENT DETECTION BY NUCLEIC ACID (DNA OR RNA); SEVERE ACUTE RESPIRATORY SYNDROME CORONAVIRUS 2 (SARS-COV-2) (CORONAVIRUS DISEASE [COVID-19]), AMPLIFIED PROBE TECHNIQUE, MAKING USE OF HIGH THROUGHPUT TECHNOLOGIES AS DESCRIBED BY CMS-2020-01-R: HCPCS | Mod: ZL

## 2021-08-04 PROCEDURE — C9803 HOPD COVID-19 SPEC COLLECT: HCPCS

## 2021-09-11 ENCOUNTER — HEALTH MAINTENANCE LETTER (OUTPATIENT)
Age: 66
End: 2021-09-11

## 2021-12-12 ENCOUNTER — APPOINTMENT (OUTPATIENT)
Dept: CT IMAGING | Facility: OTHER | Age: 66
End: 2021-12-12
Attending: FAMILY MEDICINE
Payer: MEDICARE

## 2021-12-12 ENCOUNTER — HOSPITAL ENCOUNTER (EMERGENCY)
Facility: OTHER | Age: 66
Discharge: HOME | End: 2021-12-13
Attending: FAMILY MEDICINE | Admitting: FAMILY MEDICINE
Payer: MEDICARE

## 2021-12-12 DIAGNOSIS — J20.9 ACUTE BRONCHITIS, UNSPECIFIED ORGANISM: ICD-10-CM

## 2021-12-12 LAB
ALBUMIN SERPL-MCNC: 4.4 G/DL (ref 3.5–5.7)
ALBUMIN UR-MCNC: NEGATIVE MG/DL
ALP SERPL-CCNC: 39 U/L (ref 34–104)
ALT SERPL W P-5'-P-CCNC: 15 U/L (ref 7–52)
ANION GAP SERPL CALCULATED.3IONS-SCNC: 7 MMOL/L (ref 3–14)
APPEARANCE UR: CLEAR
AST SERPL W P-5'-P-CCNC: 19 U/L (ref 13–39)
BASOPHILS # BLD AUTO: 0 10E3/UL (ref 0–0.2)
BASOPHILS NFR BLD AUTO: 0 %
BILIRUB SERPL-MCNC: 0.6 MG/DL (ref 0.3–1)
BILIRUB UR QL STRIP: NEGATIVE
BUN SERPL-MCNC: 16 MG/DL (ref 7–25)
CALCIUM SERPL-MCNC: 9.5 MG/DL (ref 8.6–10.3)
CHLORIDE BLD-SCNC: 101 MMOL/L (ref 98–107)
CO2 SERPL-SCNC: 28 MMOL/L (ref 21–31)
COLOR UR AUTO: YELLOW
CREAT SERPL-MCNC: 1.13 MG/DL (ref 0.7–1.3)
EOSINOPHIL # BLD AUTO: 0.1 10E3/UL (ref 0–0.7)
EOSINOPHIL NFR BLD AUTO: 1 %
ERYTHROCYTE [DISTWIDTH] IN BLOOD BY AUTOMATED COUNT: 12.9 % (ref 10–15)
FLUAV RNA SPEC QL NAA+PROBE: NEGATIVE
FLUBV RNA RESP QL NAA+PROBE: NEGATIVE
GFR SERPL CREATININE-BSD FRML MDRD: 66 ML/MIN/1.73M2
GLUCOSE BLD-MCNC: 110 MG/DL (ref 70–105)
GLUCOSE UR STRIP-MCNC: NEGATIVE MG/DL
HCT VFR BLD AUTO: 42.1 % (ref 40–53)
HGB BLD-MCNC: 14 G/DL (ref 13.3–17.7)
HGB UR QL STRIP: NEGATIVE
IMM GRANULOCYTES # BLD: 0 10E3/UL
IMM GRANULOCYTES NFR BLD: 0 %
KETONES UR STRIP-MCNC: NEGATIVE MG/DL
LACTATE SERPL-SCNC: 1.5 MMOL/L (ref 0.7–2)
LEUKOCYTE ESTERASE UR QL STRIP: NEGATIVE
LYMPHOCYTES # BLD AUTO: 0.7 10E3/UL (ref 0.8–5.3)
LYMPHOCYTES NFR BLD AUTO: 6 %
MCH RBC QN AUTO: 30 PG (ref 26.5–33)
MCHC RBC AUTO-ENTMCNC: 33.3 G/DL (ref 31.5–36.5)
MCV RBC AUTO: 90 FL (ref 78–100)
MONOCYTES # BLD AUTO: 0.6 10E3/UL (ref 0–1.3)
MONOCYTES NFR BLD AUTO: 5 %
NEUTROPHILS # BLD AUTO: 11.7 10E3/UL (ref 1.6–8.3)
NEUTROPHILS NFR BLD AUTO: 88 %
NITRATE UR QL: NEGATIVE
NRBC # BLD AUTO: 0 10E3/UL
NRBC BLD AUTO-RTO: 0 /100
PH UR STRIP: 6 [PH] (ref 5–9)
PLATELET # BLD AUTO: 175 10E3/UL (ref 150–450)
POTASSIUM BLD-SCNC: 4.4 MMOL/L (ref 3.5–5.1)
PROT SERPL-MCNC: 7.2 G/DL (ref 6.4–8.9)
RBC # BLD AUTO: 4.66 10E6/UL (ref 4.4–5.9)
RSV RNA SPEC NAA+PROBE: NEGATIVE
SARS-COV-2 RNA RESP QL NAA+PROBE: NEGATIVE
SODIUM SERPL-SCNC: 136 MMOL/L (ref 134–144)
SP GR UR STRIP: >1.05 (ref 1–1.03)
TROPONIN I SERPL-MCNC: 3.9 PG/ML (ref 0–34)
UROBILINOGEN UR STRIP-MCNC: NORMAL MG/DL
WBC # BLD AUTO: 13.2 10E3/UL (ref 4–11)

## 2021-12-12 PROCEDURE — 36415 COLL VENOUS BLD VENIPUNCTURE: CPT | Performed by: FAMILY MEDICINE

## 2021-12-12 PROCEDURE — 94640 AIRWAY INHALATION TREATMENT: CPT

## 2021-12-12 PROCEDURE — 93005 ELECTROCARDIOGRAM TRACING: CPT | Performed by: FAMILY MEDICINE

## 2021-12-12 PROCEDURE — 96374 THER/PROPH/DIAG INJ IV PUSH: CPT | Mod: XU | Performed by: FAMILY MEDICINE

## 2021-12-12 PROCEDURE — 250N000011 HC RX IP 250 OP 636: Performed by: FAMILY MEDICINE

## 2021-12-12 PROCEDURE — 84484 ASSAY OF TROPONIN QUANT: CPT | Performed by: FAMILY MEDICINE

## 2021-12-12 PROCEDURE — 250N000013 HC RX MED GY IP 250 OP 250 PS 637: Performed by: FAMILY MEDICINE

## 2021-12-12 PROCEDURE — 80053 COMPREHEN METABOLIC PANEL: CPT | Performed by: FAMILY MEDICINE

## 2021-12-12 PROCEDURE — 99285 EMERGENCY DEPT VISIT HI MDM: CPT | Mod: 25 | Performed by: FAMILY MEDICINE

## 2021-12-12 PROCEDURE — 93010 ELECTROCARDIOGRAM REPORT: CPT | Performed by: INTERNAL MEDICINE

## 2021-12-12 PROCEDURE — 99283 EMERGENCY DEPT VISIT LOW MDM: CPT | Performed by: FAMILY MEDICINE

## 2021-12-12 PROCEDURE — 250N000009 HC RX 250: Performed by: FAMILY MEDICINE

## 2021-12-12 PROCEDURE — 87637 SARSCOV2&INF A&B&RSV AMP PRB: CPT | Performed by: FAMILY MEDICINE

## 2021-12-12 PROCEDURE — 81003 URINALYSIS AUTO W/O SCOPE: CPT | Performed by: FAMILY MEDICINE

## 2021-12-12 PROCEDURE — C9803 HOPD COVID-19 SPEC COLLECT: HCPCS | Performed by: FAMILY MEDICINE

## 2021-12-12 PROCEDURE — 83605 ASSAY OF LACTIC ACID: CPT | Performed by: FAMILY MEDICINE

## 2021-12-12 PROCEDURE — 999N000157 HC STATISTIC RCP TIME EA 10 MIN

## 2021-12-12 PROCEDURE — 258N000003 HC RX IP 258 OP 636: Performed by: FAMILY MEDICINE

## 2021-12-12 PROCEDURE — 71275 CT ANGIOGRAPHY CHEST: CPT | Mod: TC

## 2021-12-12 PROCEDURE — 85025 COMPLETE CBC W/AUTO DIFF WBC: CPT | Performed by: FAMILY MEDICINE

## 2021-12-12 RX ORDER — ACETAMINOPHEN 325 MG/1
650 TABLET ORAL EVERY 4 HOURS PRN
Status: DISCONTINUED | OUTPATIENT
Start: 2021-12-12 | End: 2021-12-13 | Stop reason: HOSPADM

## 2021-12-12 RX ORDER — IOPAMIDOL 755 MG/ML
100 INJECTION, SOLUTION INTRAVASCULAR ONCE
Status: COMPLETED | OUTPATIENT
Start: 2021-12-12 | End: 2021-12-12

## 2021-12-12 RX ORDER — ALBUTEROL SULFATE 0.83 MG/ML
2.5 SOLUTION RESPIRATORY (INHALATION)
Status: DISCONTINUED | OUTPATIENT
Start: 2021-12-12 | End: 2021-12-13 | Stop reason: HOSPADM

## 2021-12-12 RX ADMIN — SODIUM CHLORIDE 1000 ML: 9 INJECTION, SOLUTION INTRAVENOUS at 23:17

## 2021-12-12 RX ADMIN — IOPAMIDOL 100 ML: 755 INJECTION, SOLUTION INTRAVENOUS at 22:22

## 2021-12-12 RX ADMIN — ACETAMINOPHEN 650 MG: 325 TABLET, FILM COATED ORAL at 23:48

## 2021-12-12 RX ADMIN — ALBUTEROL SULFATE 2.5 MG: 2.5 SOLUTION RESPIRATORY (INHALATION) at 23:23

## 2021-12-12 ASSESSMENT — MIFFLIN-ST. JEOR: SCORE: 1749.22

## 2021-12-13 VITALS
HEIGHT: 71 IN | OXYGEN SATURATION: 92 % | SYSTOLIC BLOOD PRESSURE: 133 MMHG | DIASTOLIC BLOOD PRESSURE: 86 MMHG | TEMPERATURE: 99 F | WEIGHT: 211 LBS | RESPIRATION RATE: 14 BRPM | BODY MASS INDEX: 29.54 KG/M2 | HEART RATE: 73 BPM

## 2021-12-13 LAB
TROPONIN I SERPL-MCNC: 5 PG/ML (ref 0–34)
TROPONIN I SERPL-MCNC: 5.5 PG/ML (ref 0–34)

## 2021-12-13 PROCEDURE — 84484 ASSAY OF TROPONIN QUANT: CPT | Performed by: FAMILY MEDICINE

## 2021-12-13 PROCEDURE — 250N000011 HC RX IP 250 OP 636: Performed by: FAMILY MEDICINE

## 2021-12-13 PROCEDURE — 36415 COLL VENOUS BLD VENIPUNCTURE: CPT | Performed by: FAMILY MEDICINE

## 2021-12-13 RX ORDER — DEXAMETHASONE SODIUM PHOSPHATE 4 MG/ML
4 INJECTION, SOLUTION INTRA-ARTICULAR; INTRALESIONAL; INTRAMUSCULAR; INTRAVENOUS; SOFT TISSUE ONCE
Status: COMPLETED | OUTPATIENT
Start: 2021-12-13 | End: 2021-12-13

## 2021-12-13 RX ORDER — AZITHROMYCIN 250 MG/1
TABLET, FILM COATED ORAL
Qty: 6 TABLET | Refills: 0 | Status: SHIPPED | OUTPATIENT
Start: 2021-12-13 | End: 2021-12-18

## 2021-12-13 RX ADMIN — DEXAMETHASONE SODIUM PHOSPHATE 4 MG: 4 INJECTION, SOLUTION INTRAMUSCULAR; INTRAVENOUS at 00:43

## 2021-12-13 ASSESSMENT — ENCOUNTER SYMPTOMS
EYE REDNESS: 0
ABDOMINAL PAIN: 0
DIFFICULTY URINATING: 0
SHORTNESS OF BREATH: 1
COUGH: 1
CONFUSION: 0
NECK STIFFNESS: 0
ARTHRALGIAS: 0
HEADACHES: 0
COLOR CHANGE: 0
FEVER: 0
CHEST TIGHTNESS: 1

## 2021-12-13 NOTE — ED PROVIDER NOTES
"  History     Chief Complaint   Patient presents with     Shortness of Breath     Chest Pain     HPI  Hipolito Stout is a 66 year old male with history of reflux, thyroid nodule, BPH, remote smoking history who presents to the emergency department with a episode of chest tightness and shortness of breath that started approximately 6:00 today.  Patient has felt \"wheezy\" for several days but the chest pressure and significant shortness of breath did not start until today.  Patient is reluctant to describe it as chest pain to me, he says it is just a pressure burning primarily with deep breathing.  No nausea vomiting or diarrhea.  No recent exertional chest pains.  No excessive sweating, vomiting or abdominal pain.    Reviewed nurses notes below, similar history is related to me.  Hipolito Stout is a 66 year old Male that presents to triage private car  With history of  Chest pain started at 1800 after eating, SOB, pt c/o wheezing,  reported by patient   Significant symptoms had onset 1800 minute(s) ago.     Allergies:  Allergies   Allergen Reactions     Cats        Problem List:    Patient Active Problem List    Diagnosis Date Noted     LPRD (laryngopharyngeal reflux disease) 10/02/2013     Priority: Medium     Mass of oral cavity 10/02/2013     Priority: Medium     Thyroid nodule 10/02/2013     Priority: Medium     Neck pain on left side 10/02/2013     Priority: Medium        Past Medical History:    No past medical history on file.    Past Surgical History:    Past Surgical History:   Procedure Laterality Date     ABDOMEN SURGERY  08/2012    hernia repair     CHOLECYSTECTOMY       EYE SURGERY      cataract bilateral     ORTHOPEDIC SURGERY      carpal tunnel release bilateral       Family History:    Family History   Problem Relation Age of Onset     Heart Disease Mother      Cancer - colorectal Mother      Heart Disease Father      Diabetes Father      Cancer Maternal Grandfather         prostate     " "Heart Disease Paternal Grandmother      Diabetes Paternal Grandmother      Cerebrovascular Disease Paternal Grandfather        Social History:  Marital Status:   [2]  Social History     Tobacco Use     Smoking status: Former Smoker     Packs/day: 0.00     Smokeless tobacco: Never Used   Substance Use Topics     Alcohol use: Yes     Comment: social     Drug use: No        Medications:    azithromycin (ZITHROMAX) 250 MG tablet  aspirin (ASA) 81 MG chewable tablet  Cholecalciferol (VITAMIN D) 2000 UNITS CAPS  Cyanocobalamin (VITAMIN B-12 PO)  FISH OIL  Metoprolol Tartrate (LOPRESSOR PO)  Multiple Vitamins-Minerals (MULTIVITAMIN OR)  omeprazole (PRILOSEC) 20 MG DR capsule  rosuvastatin (CRESTOR) 40 MG tablet  sildenafil (REVATIO) 20 MG tablet  tamsulosin (FLOMAX) 0.4 MG capsule  VITAMIN E          Review of Systems   Constitutional: Negative for fever.   HENT: Positive for congestion.    Eyes: Negative for redness.   Respiratory: Positive for cough, chest tightness and shortness of breath.    Cardiovascular: Negative for chest pain.   Gastrointestinal: Negative for abdominal pain.   Genitourinary: Negative for difficulty urinating.   Musculoskeletal: Negative for arthralgias and neck stiffness.   Skin: Negative for color change.   Neurological: Negative for headaches.   Psychiatric/Behavioral: Negative for confusion.       Physical Exam   BP: (!) 169/81  Pulse: (!) 124  Temp: (!) 102.8  F (39.3  C)  Resp: 26  Height: 180.3 cm (5' 11\")  Weight: 95.7 kg (211 lb)  SpO2: 94 %      Physical Exam  Vitals and nursing note reviewed.   Constitutional:       General: He is not in acute distress.     Appearance: He is not diaphoretic.   HENT:      Head: Atraumatic.   Eyes:      General: No scleral icterus.     Pupils: Pupils are equal, round, and reactive to light.   Cardiovascular:      Heart sounds: Normal heart sounds.   Pulmonary:      Effort: No respiratory distress.      Breath sounds: Rales present. No rhonchi. "   Abdominal:      General: Bowel sounds are normal.      Palpations: Abdomen is soft.      Tenderness: There is no abdominal tenderness.   Musculoskeletal:         General: No tenderness.   Skin:     General: Skin is warm.      Findings: No rash.         ED Course   EKG: Sinus tachycardia rate 120, new right bundle branch block,  ms.  When compared with previous EKG bundle branch block is new.      Results for orders placed or performed during the hospital encounter of 12/12/21 (from the past 24 hour(s))   CBC with platelets differential    Narrative    The following orders were created for panel order CBC with platelets differential.  Procedure                               Abnormality         Status                     ---------                               -----------         ------                     CBC with platelets and d...[[id number]]  Abnormal            Final result                 Please view results for these tests on the individual orders.   Comprehensive metabolic panel   Result Value Ref Range    Sodium 136 134 - 144 mmol/L    Potassium 4.4 3.5 - 5.1 mmol/L    Chloride 101 98 - 107 mmol/L    Carbon Dioxide (CO2) 28 21 - 31 mmol/L    Anion Gap 7 3 - 14 mmol/L    Urea Nitrogen 16 7 - 25 mg/dL    Creatinine 1.13 0.70 - 1.30 mg/dL    Calcium 9.5 8.6 - 10.3 mg/dL    Glucose 110 (H) 70 - 105 mg/dL    Alkaline Phosphatase 39 34 - 104 U/L    AST 19 13 - 39 U/L    ALT 15 7 - 52 U/L    Protein Total 7.2 6.4 - 8.9 g/dL    Albumin 4.4 3.5 - 5.7 g/dL    Bilirubin Total 0.6 0.3 - 1.0 mg/dL    GFR Estimate 66 >60 mL/min/1.73m2   Troponin I - Now then every 6 hours x 3   Result Value Ref Range    Troponin I 3.9 0.0 - 34.0 pg/mL   CBC with platelets and differential   Result Value Ref Range    WBC Count 13.2 (H) 4.0 - 11.0 10e3/uL    RBC Count 4.66 4.40 - 5.90 10e6/uL    Hemoglobin 14.0 13.3 - 17.7 g/dL    Hematocrit 42.1 40.0 - 53.0 %    MCV 90 78 - 100 fL    MCH 30.0 26.5 - 33.0 pg    MCHC 33.3 31.5 - 36.5  g/dL    RDW 12.9 10.0 - 15.0 %    Platelet Count 175 150 - 450 10e3/uL    % Neutrophils 88 %    % Lymphocytes 6 %    % Monocytes 5 %    % Eosinophils 1 %    % Basophils 0 %    % Immature Granulocytes 0 %    NRBCs per 100 WBC 0 <1 /100    Absolute Neutrophils 11.7 (H) 1.6 - 8.3 10e3/uL    Absolute Lymphocytes 0.7 (L) 0.8 - 5.3 10e3/uL    Absolute Monocytes 0.6 0.0 - 1.3 10e3/uL    Absolute Eosinophils 0.1 0.0 - 0.7 10e3/uL    Absolute Basophils 0.0 0.0 - 0.2 10e3/uL    Absolute Immature Granulocytes 0.0 <=0.4 10e3/uL    Absolute NRBCs 0.0 10e3/uL   Symptomatic Influenza A/B & SARS-CoV2 (COVID-19) Virus PCR Multiplex Nose    Specimen: Nose; Swab   Result Value Ref Range    Influenza A PCR Negative Negative    Influenza B PCR Negative Negative    RSV PCR Negative Negative    SARS CoV2 PCR Negative Negative    Narrative    Testing was performed using the Xpert Xpress CoV2/Flu/RSV Assay on the Cepheid GeneXpert Instrument. This test should be ordered for the detection of SARS-CoV-2 and influenza viruses in individuals who meet clinical and/or epidemiological criteria. Test performance is unknown in asymptomatic patients. This test is for in vitro diagnostic use under the FDA EUA for laboratories certified under CLIA to perform high or moderate complexity testing. This test has not been FDA cleared or approved. A negative result does not rule out the presence of PCR inhibitors in the specimen or target RNA in concentration below the limit of detection for the assay. If only one viral target is positive but coinfection with multiple targets is suspected, the sample should be re-tested with another FDA cleared, approved, or authorized test, if coinfection would change clinical management. This test was validated by the [facility] [employer]. These laboratories are certified under the Clinical  Laboratory Improvement Amendments of 1988 (CLIA-88) as qualified to perform high complexity laboratory testing.   CT Chest  Pulmonary Embolism w Contrast    Narrative    PROCEDURE INFORMATION:   Exam: CTA Chest With Contrast   Exam date and time: 12/12/2021 10:08 PM   Age: 66 years old   Clinical indication: Shortness of breath     TECHNIQUE:   Imaging protocol: Computed tomographic angiography of the chest with contrast.   3D rendering (Not supervised by radiologist): MIP and/or 3D reconstructed   images were created by the technologist.   Radiation optimization: All CT scans at this facility use at least one of these   dose optimization techniques: automated exposure control; mA and/or kV   adjustment per patient size (includes targeted exams where dose is matched to   clinical indication); or iterative reconstruction.   Contrast material: ISOVUE 370; Contrast volume: 100 ml; Contrast route:   INTRAVENOUS (IV);      COMPARISON:   CT CHEST PE ABDOMEN PELVIS W CONTRAST 12/28/2020 11:44 AM     FINDINGS:   Pulmonary arteries: Evaluation of the pulmonary arteries is markedly limited   due to suboptimal intravenous contrast timing bolus. No definite pulmonary   emboli are seen in the main pulmonary arteries or proximal segmental branches.   Aorta: Mild atherosclerotic changes of the thoracic aorta without aneurysm or   dissection.     Lungs: There is subsegmental atelectasis in the right middle lobe.   Pleural spaces: Unremarkable. No pneumothorax. No pleural effusion.   Heart: Moderate coronary artery calcifications. No cardiomegaly or pericardial   effusion.   Lymph nodes: Unremarkable. No enlarged lymph nodes.   Diaphragm: There is elevation of the right hemidiaphragm.     Liver: There is fatty infiltration of the liver.   Gallbladder and bile ducts: Surgical clips are seen the gallbladder fossa   compatible with cholecystectomy.   Kidneys and ureters: There is several cysts in the right kidney measuring up to   1.9 cm x 1.8 cm in the mid to lower pole of the right kidney.   Bones/joints: There are mild-to-moderate multilevel degenerative  changes of the   thoracic spine.   Soft tissues: Unremarkable.       Impression    IMPRESSION:   1. Markedly limited evaluation of the mid and distal branches of the pulmonary   arteries due to suboptimal intravenous contrast timing bolus. No definite CT   evidence of pulmonary emboli in the main pulmonary arteries or their proximal   segmental branches.   2. No aneurysm or dissection of the thoracic aorta.   3. No CT evidence of acute pulmonary process. Subsegmental atelectasis in the   right middle lobe.   4. Elevation of the right hemidiaphragm.   5. Hepatic steatosis.   6. Right renal cysts.   7. Status post cholecystectomy.     COMMENTS:   Consistent with the American College of Radiology's Incidental Findings   Committee white paper (J Am Eduar Radiol 2018): Any incidental renal lesion less   than 1 cm or classified as too small to characterize, or any incidental cystic   renal lesion characterized as simple-appearing, is likely benign. No follow-up   imaging is recommended for these lesions per consensus recommendations based on   imaging criteria.     THIS DOCUMENT HAS BEEN ELECTRONICALLY SIGNED BY STEPHANIE YOUNG DO   Lactic acid whole blood STAT   Result Value Ref Range    Lactic Acid 1.5 0.7 - 2.0 mmol/L   UA reflex to Microscopic and Culture    Specimen: Urine, Clean Catch   Result Value Ref Range    Color Urine Yellow Colorless, Straw, Light Yellow, Yellow    Appearance Urine Clear Clear    Glucose Urine Negative Negative mg/dL    Bilirubin Urine Negative Negative    Ketones Urine Negative Negative mg/dL    Specific Gravity Urine >1.050 (H) 1.003 - 1.035    Blood Urine Negative Negative    pH Urine 6.0 5.0 - 9.0    Protein Albumin Urine Negative Negative mg/dL    Urobilinogen Urine Normal Normal, 2.0 mg/dL    Nitrite Urine Negative Negative    Leukocyte Esterase Urine Negative Negative    Narrative    Microscopic not indicated   Troponin I (second draw)   Result Value Ref Range    Troponin I 5.0 0.0 -  34.0 pg/mL   Troponin I - Now then every 6 hours x 3   Result Value Ref Range    Troponin I 5.5 0.0 - 34.0 pg/mL       Medications   albuterol (PROVENTIL) neb solution 2.5 mg (2.5 mg Nebulization Given 12/12/21 2323)   acetaminophen (TYLENOL) tablet 650 mg (650 mg Oral Given 12/12/21 2348)   iopamidol (ISOVUE-370) solution 100 mL (100 mLs Intravenous Given 12/12/21 2222)   0.9% sodium chloride BOLUS (0 mLs Intravenous Stopped 12/13/21 0037)   dexamethasone (DECADRON) injection 4 mg (4 mg Intravenous Given 12/13/21 0043)       Assessments & Plan (with Medical Decision Making)     I have reviewed the nursing notes.    I have reviewed the findings, diagnosis, plan and need for follow up with the patient.  Dyspnea with pleuritic chest pain: Differential diagnosis is broad and included ACS, pneumonia, PE, aortic emergency, pleurisy, acute bronchitis, acute exacerbation of previously undiagnosed chronic bronchitis.  Work-up is reassuring, no occult pneumonia seen on CT, no large burden of PE is identified, serial troponins are unremarkable.  With fever and wheezing clearly he has some type of infectious issue going on.  Viral swabs are negative.  Given duration, elevated white count and severity of initial symptoms we will treat as a bacterial bronchitis with azithromycin.  He does have a remote smoking history, he may have undiagnosed underlying emphysema, recommend outpatient PFTs.  Patient monitored in the emergency department for greater than 4 hours to document improvement in clinical course as admission is not a possibility given current medical divert at [facility].  Patient will return to the emergency department worsening symptoms otherwise follow-up with primary.  Over the course of his ED stay he defervesced and is feeling better.  Patient hemodynamically stable and ambulated well prior to discharge.  Patient verbalized understanding of plan is in agreement with plan.  New Prescriptions    AZITHROMYCIN  (ZITHROMAX) 250 MG TABLET    Take 2 tablets (500 mg) by mouth daily for 1 day, THEN 1 tablet (250 mg) daily for 4 days.       Final diagnoses:   Acute bronchitis, unspecified organism       12/12/2021   [facility]  12/13/21 [id number]

## 2021-12-13 NOTE — ED TRIAGE NOTES
ED Nursing Triage Note (General)   ________________________________    Hipolito Stout is a 66 year old Male that presents to triage private car  With history of  Chest pain started at 1800 after eating, SOB, pt c/o wheezing,  reported by patient   Significant symptoms had onset 1800 minute(s) ago.    Patient appears alert , uncomfortable., and cooperative behavior.    GCS Total = 15  Airway: intact  Breathing noted as Normal, RR 26  Circulation Normal  Skin:  Normal  Action taken:  Triage to critical care immediately      PRE HOSPITAL PRIOR LIVING SITUATION Spouse

## 2021-12-16 LAB
ATRIAL RATE - MUSE: 120 BPM
DIASTOLIC BLOOD PRESSURE - MUSE: NORMAL MMHG
INTERPRETATION ECG - MUSE: NORMAL
P AXIS - MUSE: 67 DEGREES
PR INTERVAL - MUSE: 166 MS
QRS DURATION - MUSE: 142 MS
QT - MUSE: 356 MS
QTC - MUSE: 503 MS
R AXIS - MUSE: 87 DEGREES
SYSTOLIC BLOOD PRESSURE - MUSE: NORMAL MMHG
T AXIS - MUSE: 51 DEGREES
VENTRICULAR RATE- MUSE: 120 BPM

## 2022-01-06 ENCOUNTER — ALLIED HEALTH/NURSE VISIT (OUTPATIENT)
Dept: FAMILY MEDICINE | Facility: OTHER | Age: 67
End: 2022-01-06
Attending: FAMILY MEDICINE
Payer: MEDICARE

## 2022-01-06 DIAGNOSIS — Z20.822 COVID-19 RULED OUT: Primary | ICD-10-CM

## 2022-01-06 DIAGNOSIS — R09.89 CHEST CONGESTION: ICD-10-CM

## 2022-01-06 DIAGNOSIS — Z20.822 EXPOSURE TO 2019 NOVEL CORONAVIRUS: ICD-10-CM

## 2022-01-06 PROCEDURE — C9803 HOPD COVID-19 SPEC COLLECT: HCPCS

## 2022-01-06 PROCEDURE — U0003 INFECTIOUS AGENT DETECTION BY NUCLEIC ACID (DNA OR RNA); SEVERE ACUTE RESPIRATORY SYNDROME CORONAVIRUS 2 (SARS-COV-2) (CORONAVIRUS DISEASE [COVID-19]), AMPLIFIED PROBE TECHNIQUE, MAKING USE OF HIGH THROUGHPUT TECHNOLOGIES AS DESCRIBED BY CMS-2020-01-R: HCPCS | Mod: ZL

## 2022-01-06 NOTE — PROGRESS NOTES
Patient swabbed for COVID-19 testing.  Congested exposed  Giselle Galindo MA on 1/6/2022 at 11:12 AM

## 2022-01-07 LAB — SARS-COV-2 RNA RESP QL NAA+PROBE: NEGATIVE

## 2022-01-18 ENCOUNTER — ALLIED HEALTH/NURSE VISIT (OUTPATIENT)
Dept: FAMILY MEDICINE | Facility: OTHER | Age: 67
End: 2022-01-18
Attending: FAMILY MEDICINE
Payer: MEDICARE

## 2022-01-18 DIAGNOSIS — R53.83 FATIGUE: ICD-10-CM

## 2022-01-18 DIAGNOSIS — J02.9 SORE THROAT: Primary | ICD-10-CM

## 2022-01-18 PROCEDURE — C9803 HOPD COVID-19 SPEC COLLECT: HCPCS

## 2022-01-18 PROCEDURE — U0005 INFEC AGEN DETEC AMPLI PROBE: HCPCS | Mod: ZL

## 2022-01-18 NOTE — PROGRESS NOTES
Patient here for Covid Testing. Fatigue and sore/ scratchy throat.    Niru Jurado MA on 1/18/2022 at 1:36 PM

## 2022-01-19 LAB — SARS-COV-2 RNA RESP QL NAA+PROBE: NORMAL

## 2022-01-20 ENCOUNTER — TELEPHONE (OUTPATIENT)
Dept: FAMILY MEDICINE | Facility: OTHER | Age: 67
End: 2022-01-20
Payer: COMMERCIAL

## 2022-01-20 LAB — SARS-COV-2 RNA RESP QL NAA+PROBE: NOT DETECTED

## 2022-01-20 NOTE — TELEPHONE ENCOUNTER
After verification of name and date of birth, patient notified of results per provider. Patient verbalizes understanding and has no further questions or concerns.  Carina Choudhury RN ....................  1/20/2022   3:58 PM

## 2022-04-23 ENCOUNTER — HEALTH MAINTENANCE LETTER (OUTPATIENT)
Age: 67
End: 2022-04-23

## 2022-10-30 ENCOUNTER — HEALTH MAINTENANCE LETTER (OUTPATIENT)
Age: 67
End: 2022-10-30

## 2023-02-11 ENCOUNTER — HOSPITAL ENCOUNTER (OUTPATIENT)
Dept: MRI IMAGING | Facility: OTHER | Age: 68
Discharge: HOME | End: 2023-02-11
Attending: FAMILY MEDICINE | Admitting: FAMILY MEDICINE
Payer: MEDICARE

## 2023-02-11 DIAGNOSIS — M48.02 SPINAL STENOSIS IN CERVICAL REGION: ICD-10-CM

## 2023-02-11 PROCEDURE — 72141 MRI NECK SPINE W/O DYE: CPT

## 2023-02-24 ENCOUNTER — HOSPITAL ENCOUNTER (OUTPATIENT)
Dept: MRI IMAGING | Facility: OTHER | Age: 68
Discharge: HOME | End: 2023-02-24
Attending: FAMILY MEDICINE
Payer: MEDICARE

## 2023-02-24 DIAGNOSIS — D33.3 ACOUSTIC NEUROMA (H): ICD-10-CM

## 2023-02-24 DIAGNOSIS — R53.1 LEFT-SIDED WEAKNESS: ICD-10-CM

## 2023-02-24 DIAGNOSIS — D33.3: ICD-10-CM

## 2023-02-24 DIAGNOSIS — G31.9 NEURODEGENERATIVE DISORDER (H): ICD-10-CM

## 2023-02-24 PROCEDURE — 70543 MRI ORBT/FAC/NCK W/O &W/DYE: CPT

## 2023-02-24 PROCEDURE — A9575 INJ GADOTERATE MEGLUMI 0.1ML: HCPCS | Performed by: RADIOLOGY

## 2023-02-24 PROCEDURE — 70553 MRI BRAIN STEM W/O & W/DYE: CPT

## 2023-02-24 PROCEDURE — 255N000002 HC RX 255 OP 636: Performed by: RADIOLOGY

## 2023-02-24 RX ADMIN — GADOTERATE MEGLUMINE 18 ML: 376.9 INJECTION INTRAVENOUS at 17:55

## 2023-06-01 ENCOUNTER — HEALTH MAINTENANCE LETTER (OUTPATIENT)
Age: 68
End: 2023-06-01

## 2023-11-15 ENCOUNTER — HOSPITAL ENCOUNTER (OUTPATIENT)
Dept: MRI IMAGING | Facility: OTHER | Age: 68
Discharge: HOME | End: 2023-11-15
Attending: NURSE PRACTITIONER | Admitting: NURSE PRACTITIONER
Payer: MEDICARE

## 2023-11-15 DIAGNOSIS — M62.81 MUSCLE WEAKNESS OF LEFT ARM: ICD-10-CM

## 2023-11-15 PROCEDURE — 72141 MRI NECK SPINE W/O DYE: CPT

## 2023-11-16 ENCOUNTER — HOSPITAL ENCOUNTER (OUTPATIENT)
Dept: MRI IMAGING | Facility: OTHER | Age: 68
Discharge: HOME | End: 2023-11-16
Attending: NURSE PRACTITIONER
Payer: MEDICARE

## 2023-11-16 DIAGNOSIS — M54.16 LUMBAR RADICULOPATHY: ICD-10-CM

## 2023-11-16 DIAGNOSIS — M62.81 MUSCLE WEAKNESS OF EXTREMITY: ICD-10-CM

## 2023-11-16 PROCEDURE — 72148 MRI LUMBAR SPINE W/O DYE: CPT

## 2023-11-16 PROCEDURE — 72146 MRI CHEST SPINE W/O DYE: CPT

## 2024-05-07 DIAGNOSIS — N39.0 RECURRENT URINARY TRACT INFECTION: Primary | ICD-10-CM

## 2024-06-09 ENCOUNTER — HEALTH MAINTENANCE LETTER (OUTPATIENT)
Age: 69
End: 2024-06-09

## 2024-09-30 ENCOUNTER — APPOINTMENT (OUTPATIENT)
Dept: CT IMAGING | Facility: OTHER | Age: 69
End: 2024-09-30
Attending: PHYSICIAN ASSISTANT
Payer: MEDICARE

## 2024-09-30 ENCOUNTER — HOSPITAL ENCOUNTER (EMERGENCY)
Facility: OTHER | Age: 69
Discharge: HOME | End: 2024-09-30
Attending: PHYSICIAN ASSISTANT
Payer: MEDICARE

## 2024-09-30 VITALS
WEIGHT: 192 LBS | OXYGEN SATURATION: 95 % | HEART RATE: 69 BPM | BODY MASS INDEX: 26.88 KG/M2 | TEMPERATURE: 97.4 F | HEIGHT: 71 IN | RESPIRATION RATE: 18 BRPM | SYSTOLIC BLOOD PRESSURE: 170 MMHG | DIASTOLIC BLOOD PRESSURE: 97 MMHG

## 2024-09-30 DIAGNOSIS — S22.41XA CLOSED FRACTURE OF MULTIPLE RIBS OF RIGHT SIDE, INITIAL ENCOUNTER: ICD-10-CM
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
## 2024-09-30 PROCEDURE — 99284 EMERGENCY DEPT VISIT MOD MDM: CPT | Mod: 25 | Performed by: PHYSICIAN ASSISTANT

## 2024-09-30 PROCEDURE — 999N000157 HC STATISTIC RCP TIME EA 10 MIN

## 2024-09-30 PROCEDURE — 99283 EMERGENCY DEPT VISIT LOW MDM: CPT | Performed by: PHYSICIAN ASSISTANT

## 2024-09-30 PROCEDURE — 250N000013 HC RX MED GY IP 250 OP 250 PS 637: Performed by: PHYSICIAN ASSISTANT

## 2024-09-30 PROCEDURE — 71250 CT THORAX DX C-: CPT | Mod: MA

## 2024-09-30 RX ORDER — HYDROCODONE BITARTRATE AND ACETAMINOPHEN 5; 325 MG/1; MG/1
1 TABLET ORAL EVERY 6 HOURS PRN
Qty: 12 TABLET | Refills: 0 | Status: SHIPPED | OUTPATIENT
Start: 2024-09-30

## 2024-09-30 RX ORDER — IBUPROFEN 600 MG/1
600 TABLET, FILM COATED ORAL EVERY 6 HOURS PRN
Qty: 30 TABLET | Refills: 0 | Status: SHIPPED | OUTPATIENT
Start: 2024-09-30

## 2024-09-30 RX ORDER — HYDROCODONE BITARTRATE AND ACETAMINOPHEN 5; 325 MG/1; MG/1
1 TABLET ORAL ONCE
Status: COMPLETED | OUTPATIENT
Start: 2024-09-30 | End: 2024-09-30

## 2024-09-30 RX ADMIN — HYDROCODONE BITARTRATE AND ACETAMINOPHEN 1 TABLET: 5; 325 TABLET ORAL at 15:24

## 2024-09-30 ASSESSMENT — ACTIVITIES OF DAILY LIVING (ADL)
ADLS_ACUITY_SCORE: 33
ADLS_ACUITY_SCORE: 35

## 2024-09-30 ASSESSMENT — COLUMBIA-SUICIDE SEVERITY RATING SCALE - C-SSRS
2. HAVE YOU ACTUALLY HAD ANY THOUGHTS OF KILLING YOURSELF IN THE PAST MONTH?: NO
6. HAVE YOU EVER DONE ANYTHING, STARTED TO DO ANYTHING, OR PREPARED TO DO ANYTHING TO END YOUR LIFE?: NO
1. IN THE PAST MONTH, HAVE YOU WISHED YOU WERE DEAD OR WISHED YOU COULD GO TO SLEEP AND NOT WAKE UP?: NO

## 2024-09-30 NOTE — ED PROVIDER NOTES
EMERGENCY DEPARTMENT ENCOUNTER      NAME: Hipolito Stout  AGE: 69 year old male  YOB: 1955  MRN: [id number]  EVALUATION DATE & TIME: 9/30/2024  1:41 PM    PCP: No Ref-Primary, Physician    ED PROVIDER: Arnold Lew PA-C       CHIEF COMPLAINT:  Chief Complaint   Patient presents with    Fall    Head Injury    Back Injury         HPI  Hipolito Stout is a pleasant 69 year old male who presents to the ER via private vehicle with his wife for evaluation of possible rib injury.  About 1 hour prior to arrival, he slipped falling backwards hitting his posterior chest and lateral chest walls onto the treadmill.  Patient states he hit the top of his head on the bar of the treadmill but this was only a glancing blow.  Patient does not take any blood thinners.  Patient denying any head or neck injury.  Patient denies any syncopal episode.  Moderate pain worse with movement or breathing.  Denies any prior history of rib fractures.  No abdominal or back pain.  No pelvic pain.  No other complaints.      REVIEW OF SYSTEMS   Review of Systems  As above, otherwise ROS is unremarkable.      PAST MEDICAL HISTORY:  No past medical history on file.      PAST SURGICAL HISTORY:  Past Surgical History:   Procedure Laterality Date    ABDOMEN SURGERY  08/2012    hernia repair    CHOLECYSTECTOMY      EYE SURGERY      cataract bilateral    ORTHOPEDIC SURGERY      carpal tunnel release bilateral         CURRENT MEDICATIONS:    Current Outpatient Medications   Medication Instructions    aspirin (ASA) 81 mg, Oral, DAILY    Cholecalciferol (VITAMIN D) 2000 UNITS CAPS DAILY    Cyanocobalamin (VITAMIN B-12 PO) DAILY    FISH OIL DAILY    Metoprolol Tartrate (LOPRESSOR PO) 50 mg, Oral, DAILY    Multiple Vitamins-Minerals (MULTIVITAMIN OR) DAILY    omeprazole (PRILOSEC) 20 mg, Oral, DAILY    rosuvastatin (CRESTOR) 40 MG tablet DAILY    sildenafil (REVATIO) 20 MG tablet Take 3-5 (60-100mg) tablets by mouth 1 hour prior  to sexual activity    tamsulosin (FLOMAX) 0.4 mg, Oral, EVERY EVENING    VITAMIN E DAILY         ALLERGIES:  Allergies   Allergen Reactions    Cat Hair Extract Other (See Comments)     Respiratory distress    Cats     Linaclotide Other (See Comments) and Unknown     Side effects         FAMILY HISTORY:  Family History   Problem Relation Age of Onset    Heart Disease Mother     Cancer - colorectal Mother     Heart Disease Father     Diabetes Father     Cancer Maternal Grandfather         prostate    Heart Disease Paternal Grandmother     Diabetes Paternal Grandmother     Cerebrovascular Disease Paternal Grandfather          SOCIAL HISTORY:   Social History     Socioeconomic History    Marital status:    Tobacco Use    Smoking status: Former     Types: Cigarettes    Smokeless tobacco: Never   Substance and Sexual Activity    Alcohol use: Yes     Comment: social    Drug use: No   Other Topics Concern    Parent/sibling w/ CABG, MI or angioplasty before 65F 55M? Yes     Social Determinants of Health     Financial Resource Strain: Not on File (12/22/2023)    Received from [facility]    Financial Resource Strain     Financial Resource Strain: 0   Food Insecurity: Not on File (9/26/2024)    Received from [facility]    Food Insecurity     Food: 0   Transportation Needs: Not on File (12/22/2023)    Received from [facility]    Transportation Needs     Transportation: 0   Physical Activity: Not on File (12/22/2023)    Received from [facility]    Physical Activity     Physical Activity: 0   Stress: Not on File (12/22/2023)    Received from [facility]    Stress     Stress: 0   Social Connections: Not on File (9/18/2024)    Received from [facility]    Social Connections     Connectedness: 0   Interpersonal Safety: Not At Risk (3/23/2024)    Received from [facility]     IP Custom IPV     Do you feel UNSAFE in any of your personal relationships with your family members or any other acquaintances?: No   Housing  "Stability: Not on File (2023)    Received from [facility] Stability     Housin       ==================================================================================================================================    PHYSICAL EXAM    VITAL SIGNS: BP (!) 170/97   Pulse 69   Temp 97.4  F (36.3  C) (Tympanic)   Resp 18   Ht 1.791 m (5' 10.5\")   Wt 87.1 kg (192 lb)   SpO2 95%   BMI 27.16 kg/m      No data found.      Physical Exam  Vitals and nursing note reviewed.   Constitutional:       Appearance: Normal appearance. He is not ill-appearing or diaphoretic.      Comments: Patient appeared to be in significant discomfort from pain.   HENT:      Head: Normocephalic and atraumatic.      Right Ear: Tympanic membrane, ear canal and external ear normal.      Left Ear: Tympanic membrane, ear canal and external ear normal.      Ears:      Comments: No bleedings or signs of TM perforation     Nose: Nose normal.      Comments: No signs of active epistaxis     Mouth/Throat:      Mouth: Mucous membranes are moist.      Pharynx: Oropharynx is clear.      Comments: No signs of tongue or dental injury.  Eyes:      Extraocular Movements: Extraocular movements intact.      Conjunctiva/sclera: Conjunctivae normal.      Pupils: Pupils are equal, round, and reactive to light.   Cardiovascular:      Rate and Rhythm: Normal rate and regular rhythm.      Pulses: Normal pulses.      Heart sounds: Normal heart sounds.   Pulmonary:      Effort: Pulmonary effort is normal.      Breath sounds: Normal breath sounds. No wheezing, rhonchi or rales.   Chest:      Chest wall: Tenderness (Positive tenderness throughout the posterior, lateral, into the mild send the anterior chest wall.  No bruising.  No signs of flail chest or deformity.) present.   Abdominal:      General: Abdomen is flat. Bowel sounds are normal.      Palpations: Abdomen is soft.      Tenderness: There is no abdominal tenderness. There is no right CVA " tenderness, left CVA tenderness, guarding or rebound.   Musculoskeletal:         General: Normal range of motion.      Cervical back: Normal range of motion and neck supple. No rigidity or tenderness.      Comments: Pelvis was stable and nontender.  No long bone tenderness or deformity.  No lower limb like discrepancy.  Normal gait.   Skin:     General: Skin is warm and dry.      Coloration: Skin is not jaundiced.      Findings: No bruising.   Neurological:      General: No focal deficit present.      Mental Status: He is alert.      Comments: GCS 15.  Alert and orientated x 4.  Normal speech.  CN II-XII exam normal.   Muscle strength grossly normal and symmetrical in the upper/lower extremities.  Sensation to light touch grossly intact and symmetrical in the upper/lower extremities.   Psychiatric:         Mood and Affect: Mood normal.            ==================================================================================================================================    LABS & RADIOLOGY:  All pertinent labs reviewed and interpreted. Reviewed all pertinent imaging. Please see official radiology report.  Results for orders placed or performed during the hospital encounter of 09/30/24   CT Chest w/o Contrast    Impression    IMPRESSION:    Acute distal right 10th and 11th rib fractures. No pneumothorax.    SHAR MYERS MD         SYSTEM ID:  [id number]     CT Chest w/o Contrast   Final Result   IMPRESSION:      Acute distal right 10th and 11th rib fractures. No pneumothorax.      SHAR MYERS MD            SYSTEM ID:  [id number]            ==================================================================================================================================    ED COURSE, MEDICAL DECISION MAKING, FINAL IMPRESSION AND PLAN:     Assessment / Plan:  1. Closed fracture of multiple ribs of right side, initial encounter    2. Fall from slip, trip, or stumble, initial encounter        The patient  "was interviewed and examined.  HPI and physical exam as below.  Differential diagnosis and MDM Key Documentation Elements as below.  Vitals, triage note, and nursing notes were reviewed.  BP (!) 170/97   Pulse 69   Temp 97.4  F (36.3  C) (Tympanic)   Resp 18   Ht 1.791 m (5' 10.5\")   Wt 87.1 kg (192 lb)   SpO2 95%   BMI 27.16 kg/m      Differential includes but is not limited to rib fracture, rib contusion, intercostal muscle strain, pneumothorax, pleural effusion, hemothorax, pulmonary contusion    Patient was afebrile with elevated blood pressure.  Patient was in no acute distress but discomfort from pain.  Patient had left-sided posterior, lateral, and anterior chest wall tenderness with no gross deformity.  Normal breath sounds.  No abdominal, flank, or CVA tenderness.  No other objective findings on physical exam.    CT chest without contrast today showed rib fractures of right ribs #10 #11.  No signs of pneumothorax.  CT head was offered but declined by patient.    Exam today seems consistent with rib fractures of the right ribs #10 #11.  Plan is for patient to use ibuprofen and Tylenol at home.  Patient use Norco for pain.  Patient was given Norco for pain here in the ER without any adverse effects.  Patient also given incentive spirometry.  Educated on not using constriction bandages.  May use ice.  Modified activities for pain.  Return to the ER for any worsening symptoms.  Follow-up with primary care.    Pertinent Labs & Imaging studies reviewed. (See chart for details)  Results for orders placed or performed during the hospital encounter of 09/30/24   CT Chest w/o Contrast    Impression    IMPRESSION:    Acute distal right 10th and 11th rib fractures. No pneumothorax.    SHAR MYERS MD         SYSTEM ID:  [id number]     No results found for: \"ABORH\"      Reassessments, Medications, Interventions, & Response to Treatments:  -as above    Medications given during today's ER visit:  Medications "   HYDROcodone-acetaminophen (NORCO) 5-325 MG per tablet 1 tablet (1 tablet Oral $Given 9/30/24 1526)       Consultations:  None    Decision Rules, Medical Calculators, and Risk Stratification Tools:  None    MDM Key Documentation Elements for Patient's Evaluation:  Differential diagnosis to include high risk considerations: As above  Escalation to admission/observation considered: Admission/observation considered, but patient does not meet admission criteria  Discussions and management with other clinicians:    3a. Independent interpretation of testing performed by another health professional:  -No  3b. Discussion of management or test interpretation with another health professional: -No  Independent interpretation of tests:  Ordering and/or review of 1 test(s)  Discussion of test interpretations with radiology:  No  History obtained from source other than patient or assessment requiring an independent historian:  No  Review of non-ED/external records:  review of 1 records  Diagnostic tests considered but not ultimately performed/deferred:  -EKG  Prescription medications considered but not prescribed:  -Percocet  Chronic conditions affecting care:  -None  Care affected by social determinants of health:  -None    The patient's management involved:   - Imaging studies  - Prescription drug management    A shared decision making model was used. Time was taken to answer all questions.  Patient and/or associated parties understood and were agreeable to treatment plan.  Plan and all results were discussed. Warning signs and close return precautions to return to the ED given. Copy of results given. Discharged in stable condition. Discharged with discharge instructions outlining plan for further care and follow up.      New prescriptions started at today's ER visit  Discharge Medication List as of 9/30/2024  2:54 PM        START taking these medications    Details   HYDROcodone-acetaminophen (NORCO) 5-325 MG tablet Take 1  tablet by mouth every 6 hours as needed for severe pain., Disp-12 tablet, R-0, [facility]      ibuprofen (ADVIL/MOTRIN) 600 MG tablet Take 1 tablet (600 mg) by mouth every 6 hours as needed for moderate pain., Disp-30 tablet, R-0, [facility]             ==================================================================================================================================      I, Soren Lew PA-C, personally performed the services described in this documentation, and it is both accurate and complete.       Arnold Lew PA-C  09/30/24 1451       Arnold Lew PA-C  10/02/24 1430

## 2024-09-30 NOTE — ED TRIAGE NOTES
"Pt presents to ED via private car with is spouse. Pt fell while getting into the treadmill about an hour ago. Pt reports pain in his Rt ribs and back pain. Pt is not on thinners and reports no LOC. BP (!) 160/80   Pulse 74   Temp 97.4  F (36.3  C) (Tympanic)   Resp 18   Ht 1.791 m (5' 10.5\")   Wt 87.1 kg (192 lb)   SpO2 95%   BMI 27.16 kg/m         Triage Assessment (Adult)       Row Name 09/30/24 1338          Triage Assessment    Airway WDL WDL        Respiratory WDL    Respiratory WDL WDL        Skin Circulation/Temperature WDL    Skin Circulation/Temperature WDL WDL        Cardiac WDL    Cardiac WDL WDL        Peripheral/Neurovascular WDL    Peripheral Neurovascular WDL WDL        Cognitive/Neuro/Behavioral WDL    Cognitive/Neuro/Behavioral WDL WDL                     "

## 2024-09-30 NOTE — PROGRESS NOTES
Incentive Spirometry education completed.  Pt goal 2000 mls.  Pt achieved 1500 mls.  Pt instructed to perform 10/hr while awake with at least one deep breath and cough per hour until able to perform baseline activity.  How to use an Incentive Spirometer written instructions provided to patient. RT will follow and re-assess as need.      Kaiser, RT on 9/30/2024 at 2:41 PM

## 2024-09-30 NOTE — DISCHARGE INSTRUCTIONS
-Alternate ibuprofen and Tylenol every 3 hours for pain.  If pain is not being well-controlled, use Norco in place of Tylenol for pain relief.  Please do not take Norco and Tylenol together as they both contain Tylenol.  -Rib fractures will take 4 to 6 weeks to completely heal.  The first 3 to 5 days will be your worst pain and then you should have slow improvement in pain.  Do not recommend heavy lifting or strenuous activities during this time.  Slowly adjust her activity as pain improves.  -Do not place any constrictive wrapping around her chest and use incentive spirometry to help prevent pneumonia.  -Return to the ER if you have worsening chest pain, shortness of breath, fever, hard time breathing, abdominal pain, or any other concerning symptom.  Otherwise she may follow-up with her primary care doctor for long-term management.

## 2024-12-05 ENCOUNTER — HOSPITAL ENCOUNTER (OUTPATIENT)
Dept: GENERAL RADIOLOGY | Facility: OTHER | Age: 69
Discharge: HOME | End: 2024-12-05
Attending: UROLOGY
Payer: MEDICARE

## 2024-12-05 DIAGNOSIS — R33.9 INCOMPLETE BLADDER EMPTYING: ICD-10-CM

## 2024-12-05 PROCEDURE — 74019 RADEX ABDOMEN 2 VIEWS: CPT

## 2025-03-03 ENCOUNTER — HOSPITAL ENCOUNTER (OUTPATIENT)
Dept: MRI IMAGING | Facility: OTHER | Age: 70
Discharge: HOME | End: 2025-03-03
Attending: FAMILY MEDICINE | Admitting: FAMILY MEDICINE
Payer: MEDICARE

## 2025-03-03 DIAGNOSIS — G20.A1 PARKINSON DISEASE (H): ICD-10-CM

## 2025-03-03 PROCEDURE — 255N000002 HC RX 255 OP 636

## 2025-03-03 PROCEDURE — 70553 MRI BRAIN STEM W/O & W/DYE: CPT

## 2025-03-03 PROCEDURE — A9575 INJ GADOTERATE MEGLUMI 0.1ML: HCPCS

## 2025-03-03 RX ADMIN — GADOTERATE MEGLUMINE 18 ML: 376.9 INJECTION INTRAVENOUS at 13:34

## 2025-05-25 ENCOUNTER — HEALTH MAINTENANCE LETTER (OUTPATIENT)
Age: 70
End: 2025-05-25

## 2025-07-12 ENCOUNTER — HOSPITAL ENCOUNTER (EMERGENCY)
Facility: OTHER | Age: 70
Discharge: HOME | End: 2025-07-12
Attending: EMERGENCY MEDICINE
Payer: MEDICARE

## 2025-07-12 VITALS
WEIGHT: 195 LBS | RESPIRATION RATE: 18 BRPM | HEART RATE: 70 BPM | BODY MASS INDEX: 27.3 KG/M2 | SYSTOLIC BLOOD PRESSURE: 159 MMHG | OXYGEN SATURATION: 94 % | HEIGHT: 71 IN | DIASTOLIC BLOOD PRESSURE: 97 MMHG | TEMPERATURE: 98.6 F

## 2025-07-12 DIAGNOSIS — R31.9 URINARY TRACT INFECTION WITH HEMATURIA, SITE UNSPECIFIED: ICD-10-CM

## 2025-07-12 DIAGNOSIS — N39.0 URINARY TRACT INFECTION WITH HEMATURIA, SITE UNSPECIFIED: ICD-10-CM

## 2025-07-12 LAB
ALBUMIN UR-MCNC: 200 MG/DL
APPEARANCE UR: ABNORMAL
BACTERIA #/AREA URNS HPF: ABNORMAL /HPF
BILIRUB UR QL STRIP: NEGATIVE
COLOR UR AUTO: ABNORMAL
GLUCOSE UR STRIP-MCNC: NEGATIVE MG/DL
HGB UR QL STRIP: ABNORMAL
KETONES UR STRIP-MCNC: NEGATIVE MG/DL
LEUKOCYTE ESTERASE UR QL STRIP: ABNORMAL
NITRATE UR QL: POSITIVE
PH UR STRIP: 7 [PH] (ref 5–9)
RBC URINE: >182 /HPF
SP GR UR STRIP: 1.02 (ref 1–1.03)
UROBILINOGEN UR STRIP-MCNC: NORMAL MG/DL
WBC CLUMPS #/AREA URNS HPF: PRESENT /HPF
WBC URINE: >182 /HPF

## 2025-07-12 PROCEDURE — 250N000011 HC RX IP 250 OP 636: Performed by: EMERGENCY MEDICINE

## 2025-07-12 PROCEDURE — 99284 EMERGENCY DEPT VISIT MOD MDM: CPT | Performed by: EMERGENCY MEDICINE

## 2025-07-12 PROCEDURE — 99283 EMERGENCY DEPT VISIT LOW MDM: CPT | Performed by: EMERGENCY MEDICINE

## 2025-07-12 PROCEDURE — 96372 THER/PROPH/DIAG INJ SC/IM: CPT | Performed by: EMERGENCY MEDICINE

## 2025-07-12 PROCEDURE — 81001 URINALYSIS AUTO W/SCOPE: CPT | Performed by: PHYSICIAN ASSISTANT

## 2025-07-12 PROCEDURE — 87186 SC STD MICRODIL/AGAR DIL: CPT | Performed by: PHYSICIAN ASSISTANT

## 2025-07-12 RX ORDER — CEFTRIAXONE SODIUM 1 G
1 VIAL (EA) INJECTION ONCE
Status: COMPLETED | OUTPATIENT
Start: 2025-07-12 | End: 2025-07-12

## 2025-07-12 RX ORDER — CEFTRIAXONE 1 G/1
1 INJECTION, POWDER, FOR SOLUTION INTRAMUSCULAR; INTRAVENOUS ONCE
Status: DISCONTINUED | OUTPATIENT
Start: 2025-07-12 | End: 2025-07-12

## 2025-07-12 RX ORDER — CEFUROXIME AXETIL 250 MG/1
250 TABLET ORAL 2 TIMES DAILY
Qty: 14 TABLET | Refills: 0 | Status: SHIPPED | OUTPATIENT
Start: 2025-07-12 | End: 2025-07-15

## 2025-07-12 RX ADMIN — CEFTRIAXONE SODIUM 1 G: 1 INJECTION, POWDER, FOR SOLUTION INTRAMUSCULAR; INTRAVENOUS at 17:27

## 2025-07-12 ASSESSMENT — ENCOUNTER SYMPTOMS
CHEST TIGHTNESS: 0
NAUSEA: 0
VOMITING: 0
SHORTNESS OF BREATH: 0
HEMATURIA: 1
LIGHT-HEADEDNESS: 0
ARTHRALGIAS: 0
DYSURIA: 0
CHILLS: 0
FEVER: 0
AGITATION: 0

## 2025-07-12 ASSESSMENT — ACTIVITIES OF DAILY LIVING (ADL)
ADLS_ACUITY_SCORE: 41
ADLS_ACUITY_SCORE: 41

## 2025-07-12 NOTE — ED NOTES
Patient brought to [facility] via . He C/O generalized weakness. He typically walks with a cane, however, has had extreme difficulty. Patient able to pivot to bed, but with moderate assist of 1. Patient placed on monitors.

## 2025-07-12 NOTE — ED PROVIDER NOTES
History     Chief Complaint   Patient presents with    Hematuria    Abdominal Pain     HPI  Hipolito Stout is a 70 year old male who says that he regularly straight caths himself to urinate.  Yesterday everything seemed fine.  This morning however he noticed some blood there.  Second time it seemed even bloody or still.  Also complaining of some left-sided abdominal discomfort.  He says that is not unusual thing and has been happening more lately.  He feels somewhat bloated there.  No change in bowel,  however he does have intermittent constipation and diarrhea.  Not feeling acutely ill with anything.    Allergies:  Allergies   Allergen Reactions    Cat Dander Other (See Comments)     Respiratory distress    Cats     Linaclotide Other (See Comments) and Unknown     Side effects       Problem List:    Patient Active Problem List    Diagnosis Date Noted    LPRD (laryngopharyngeal reflux disease) 10/02/2013     Priority: Medium    Mass of oral cavity 10/02/2013     Priority: Medium    Thyroid nodule 10/02/2013     Priority: Medium    Neck pain on left side 10/02/2013     Priority: Medium        Past Medical History:    No past medical history on file.    Past Surgical History:    Past Surgical History:   Procedure Laterality Date    ABDOMEN SURGERY  08/2012    hernia repair    CHOLECYSTECTOMY      EYE SURGERY      cataract bilateral    ORTHOPEDIC SURGERY      carpal tunnel release bilateral       Family History:    Family History   Problem Relation Age of Onset    Heart Disease Mother     Cancer - colorectal Mother     Heart Disease Father     Diabetes Father     Cancer Maternal Grandfather         prostate    Heart Disease Paternal Grandmother     Diabetes Paternal Grandmother     Cerebrovascular Disease Paternal Grandfather        Social History:  Marital Status:   [2]  Social History     Tobacco Use    Smoking status: Former     Types: Cigarettes    Smokeless tobacco: Never   Substance Use Topics     "Alcohol use: Yes     Comment: social    Drug use: No        Medications:    aspirin (ASA) 81 MG chewable tablet  cefuroxime (CEFTIN) 250 MG tablet  Cholecalciferol (VITAMIN D) 2000 UNITS CAPS  Cyanocobalamin (VITAMIN B-12 PO)  FISH OIL  HYDROcodone-acetaminophen (NORCO) 5-325 MG tablet  ibuprofen (ADVIL/MOTRIN) 600 MG tablet  Metoprolol Tartrate (LOPRESSOR PO)  Multiple Vitamins-Minerals (MULTIVITAMIN OR)  omeprazole (PRILOSEC) 20 MG DR capsule  rosuvastatin (CRESTOR) 40 MG tablet  sildenafil (REVATIO) 20 MG tablet  tamsulosin (FLOMAX) 0.4 MG capsule  VITAMIN E          Review of Systems   Constitutional:  Negative for chills and fever.   HENT:  Negative for congestion.    Eyes:  Negative for visual disturbance.   Respiratory:  Negative for chest tightness and shortness of breath.    Cardiovascular:  Negative for chest pain.   Gastrointestinal:  Negative for nausea and vomiting.   Genitourinary:  Positive for hematuria. Negative for dysuria.   Musculoskeletal:  Negative for arthralgias.   Skin:  Negative for rash.   Neurological:  Negative for light-headedness.   Psychiatric/Behavioral:  Negative for agitation.        Physical Exam   BP: 112/74  Pulse: 78  Temp: 98.6  F (37  C)  Resp: 18  Height: 179.7 cm (5' 10.75\")  Weight: 88.5 kg (195 lb)  SpO2: 96 %      Physical Exam  Vitals and nursing note reviewed.   Constitutional:       Appearance: He is well-developed.   HENT:      Head: Normocephalic and atraumatic.      Mouth/Throat:      Mouth: Mucous membranes are moist.   Eyes:      Conjunctiva/sclera: Conjunctivae normal.   Cardiovascular:      Rate and Rhythm: Normal rate and regular rhythm.      Heart sounds: Normal heart sounds.   Pulmonary:      Effort: Pulmonary effort is normal.      Breath sounds: Normal breath sounds.   Abdominal:      General: Abdomen is flat.      Tenderness: There is no right CVA tenderness or left CVA tenderness.   Skin:     General: Skin is warm and dry.   Neurological:      Mental " Status: He is alert and oriented to person, place, and time.   Psychiatric:         Mood and Affect: Mood normal.         Behavior: Behavior normal.         ED Course        Procedures                Recent Results (from the past 24 hours)   UA with Microscopic reflex to Culture    Specimen: Urine, Catheter   Result Value Ref Range    Color Urine Dark Yellow (A) Colorless, Straw, Light Yellow, Yellow    Appearance Urine Cloudy (A) Clear    Glucose Urine Negative Negative mg/dL    Bilirubin Urine Negative Negative    Ketones Urine Negative Negative mg/dL    Specific Gravity Urine 1.021 1.000 - 1.030    Blood Urine Large (A) Negative    pH Urine 7.0 5.0 - 9.0    Protein Albumin Urine 200 (A) Negative mg/dL    Urobilinogen Urine Normal Normal mg/dL    Nitrite Urine Positive (A) Negative    Leukocyte Esterase Urine Large (A) Negative    Bacteria Urine Moderate (A) None Seen /HPF    WBC Clumps Urine Present (A) None Seen /HPF    RBC Urine >182 (H) <=2 /HPF    WBC Urine >182 (H) <=5 /HPF    Narrative    Urine Culture ordered based on laboratory criteria       Medications   cefTRIAXone (ROCEPHIN) in lidocaine 1% for IM administration 1 g (has no administration in time range)       Assessments & Plan (with Medical Decision Making)     I have reviewed the nursing notes.    I have reviewed the findings, diagnosis, plan and need for follow up with the patient.  Patient clearly has UTI, likely due to his frequent self cathing.  He is nontoxic in appearance with normal vital signs.  He is given dose of IM Rocephin, prescription for Ceftin.  We discussed discharging to home or doing a little further evaluation with blood work.  He and his wife feel that he will be okay and would like to just go home.  If however he is feeling worse in any way he should return at any time for further evaluation.      New Prescriptions    CEFUROXIME (CEFTIN) 250 MG TABLET    Take 1 tablet (250 mg) by mouth 2 times daily for 7 days.       Final  diagnoses:   Urinary tract infection with hematuria, site unspecified       7/12/2025   [facility] \[facility]\""       Emir Tomas MD  07/12/25 3382

## 2025-07-12 NOTE — ED TRIAGE NOTES
"Pt comes in with wife, c/o blood in urine, pt self cath due to retention for the past year and half. Pt states he did a home UTI strip test which came back \"high\". Pt also c/o LLQ abd pain. Sees Urology in [facility]. Pt has hx of Parkinsons and feels weaker than his normal. No bladder surgeries in past.      Triage Assessment (Adult)       Row Name 07/12/25 6224          Triage Assessment    Airway WDL WDL        Respiratory WDL    Respiratory WDL WDL        Skin Circulation/Temperature WDL    Skin Circulation/Temperature WDL WDL        Cardiac WDL    Cardiac WDL WDL        Peripheral/Neurovascular WDL    Peripheral Neurovascular WDL WDL        Cognitive/Neuro/Behavioral WDL    Cognitive/Neuro/Behavioral WDL WDL                     "

## 2025-07-12 NOTE — DISCHARGE INSTRUCTIONS
You do not need to start the antibiotic until tomorrow morning.  If you are feeling worse in any way, return to ER for recheck.

## 2025-07-14 ENCOUNTER — TELEPHONE (OUTPATIENT)
Dept: NURSING | Facility: CLINIC | Age: 70
End: 2025-07-14
Payer: COMMERCIAL

## 2025-07-14 DIAGNOSIS — N39.0 URINARY TRACT INFECTION: Primary | ICD-10-CM

## 2025-07-14 LAB — BACTERIA UR CULT: ABNORMAL

## 2025-07-14 NOTE — TELEPHONE ENCOUNTER
[facility]    Reason for call: Lab Result Notification     Lab Result (including Rx patient on, if applicable).  If culture, copy of lab report at bottom.  Lab Result: Urine Culture - see below    ED Rx: cefuroxime (CEFTIN) 250 MG tablet - Take 1 tablet (250 mg) by mouth 2 times daily for 7 days   >100,000 CFU/mL Citrobacter freundii Abnormal  (Not tested)    Creatinine Level (mg/dl)   Creatinine   Date Value Ref Range Status   12/12/2021 1.13 0.70 - 1.30 mg/dL Final   12/28/2020 1.02 0.70 - 1.30 mg/dL Final    Creatinine clearance (ml/min), if applicable    Creatinine clearance cannot be calculated (Patient's most recent lab result is older than the maximum 365 days allowed.)     ED Symptoms: Patient presented to [facility] ED on 07/12/25 for evaluation of hematuria and  left-sided abdominal discomfort. Patient self-caths.    RN Recommendations/Instructions per [place] ED lab result protocol:   [facility] ED lab result protocol utilized: Urine Culture  Recommend changing antibiotic to Cipro pending patient assessment      Unable to reach patient/caregiver.     Left voicemail message requesting a call back to [phone] between 9 a.m. and 5:30 p.m. for patient's ED/UC lab results.      Letter pended to be sent via [employer].    ADDENDUM:  7/15/25 @ 11:52am- Patient's wife, Deya, returned call (CTC is on file). Relayed results below per urine culture protocol.     Symptoms: Feeling a little better - less blood and less burning, but still not cleared.    Advised patient stop cefuroxime and start Cipro 250mg PO BID x 5 days. Sent to [facility] in [place] per patient request. Care advice given and return precautions reviewed. Patient is agreeable with plan and verbalized understanding.      DAMION Mijares RN

## 2025-07-15 RX ORDER — CIPROFLOXACIN 250 MG/1
250 TABLET, FILM COATED ORAL 2 TIMES DAILY
Qty: 10 TABLET | Refills: 0 | Status: SHIPPED | OUTPATIENT
Start: 2025-07-15 | End: 2025-07-15

## 2025-07-15 RX ORDER — SULFAMETHOXAZOLE AND TRIMETHOPRIM 800; 160 MG/1; MG/1
1 TABLET ORAL 2 TIMES DAILY
Qty: 14 TABLET | Refills: 0 | Status: SHIPPED | OUTPATIENT
Start: 2025-07-15 | End: 2025-07-22

## 2025-07-15 NOTE — TELEPHONE ENCOUNTER
ADDENDUM:  Patient's wife called back at 12:20pm with concerns about Cipro interfering with Parkinson's symptoms. Stated that patient has been on Bactrim before without any difficulty and is asking if they would be an appropriate antibiotics. Informed wife that according to the urine culture Bactrim would be effective and that we can change the prescription from Cipro to Bactrim DS twice daily x 7 days. Prescription sent to [facility].     Penelope Doe RN  07/15/25 12:30 PM  [facility]  Emergency Department Lab Results RN

## (undated) RX ORDER — KETOROLAC TROMETHAMINE 30 MG/ML
INJECTION, SOLUTION INTRAMUSCULAR; INTRAVENOUS
Status: DISPENSED
Start: 2018-12-07

## (undated) RX ORDER — LIDOCAINE HYDROCHLORIDE 10 MG/ML
INJECTION, SOLUTION EPIDURAL; INFILTRATION; INTRACAUDAL; PERINEURAL
Status: DISPENSED
Start: 2025-07-12

## (undated) RX ORDER — CEFTRIAXONE SODIUM 1 G
VIAL (EA) INJECTION
Status: DISPENSED
Start: 2025-07-12

## (undated) RX ORDER — ACETAMINOPHEN 325 MG/1
TABLET ORAL
Status: DISPENSED
Start: 2021-12-12

## (undated) RX ORDER — HYDROCODONE BITARTRATE AND ACETAMINOPHEN 5; 325 MG/1; MG/1
TABLET ORAL
Status: DISPENSED
Start: 2024-09-30

## (undated) RX ORDER — ASPIRIN 81 MG/1
TABLET, CHEWABLE ORAL
Status: DISPENSED
Start: 2019-10-18

## (undated) RX ORDER — ASPIRIN 81 MG/1
TABLET, CHEWABLE ORAL
Status: DISPENSED
Start: 2020-12-28

## (undated) RX ORDER — SODIUM CHLORIDE 9 MG/ML
INJECTION, SOLUTION INTRAVENOUS
Status: DISPENSED
Start: 2021-12-12

## (undated) RX ORDER — PANTOPRAZOLE SODIUM 40 MG/10ML
INJECTION, POWDER, LYOPHILIZED, FOR SOLUTION INTRAVENOUS
Status: DISPENSED
Start: 2020-12-28

## (undated) RX ORDER — IPRATROPIUM BROMIDE AND ALBUTEROL SULFATE 2.5; .5 MG/3ML; MG/3ML
SOLUTION RESPIRATORY (INHALATION)
Status: DISPENSED
Start: 2019-10-18

## (undated) RX ORDER — DEXAMETHASONE SODIUM PHOSPHATE 4 MG/ML
INJECTION, SOLUTION INTRA-ARTICULAR; INTRALESIONAL; INTRAMUSCULAR; INTRAVENOUS; SOFT TISSUE
Status: DISPENSED
Start: 2021-12-13

## (undated) RX ORDER — ALBUTEROL SULFATE 0.83 MG/ML
SOLUTION RESPIRATORY (INHALATION)
Status: DISPENSED
Start: 2021-12-12

## (undated) RX ORDER — CYCLOBENZAPRINE HCL 10 MG
TABLET ORAL
Status: DISPENSED
Start: 2018-12-07